# Patient Record
Sex: FEMALE | Race: WHITE | NOT HISPANIC OR LATINO | Employment: FULL TIME | URBAN - METROPOLITAN AREA
[De-identification: names, ages, dates, MRNs, and addresses within clinical notes are randomized per-mention and may not be internally consistent; named-entity substitution may affect disease eponyms.]

---

## 2024-05-14 PROBLEM — R09.82 POST-NASAL DRAINAGE: Status: ACTIVE | Noted: 2024-05-14

## 2024-05-14 PROBLEM — Z20.822 EXPOSURE TO COVID-19 VIRUS: Status: ACTIVE | Noted: 2024-05-14

## 2024-05-14 PROBLEM — B07.8 COMMON WART: Status: ACTIVE | Noted: 2024-05-14

## 2024-05-14 PROBLEM — M25.50 ARTHRALGIA OF MULTIPLE JOINTS: Status: ACTIVE | Noted: 2024-05-14

## 2024-05-14 PROBLEM — J34.3 HYPERTROPHY OF BOTH INFERIOR NASAL TURBINATES: Status: ACTIVE | Noted: 2024-05-14

## 2024-05-14 PROBLEM — E01.0 THYROMEGALY: Status: ACTIVE | Noted: 2024-05-14

## 2024-05-14 PROBLEM — J03.91 RECURRENT ACUTE TONSILLITIS: Status: ACTIVE | Noted: 2024-05-14

## 2024-05-14 PROBLEM — G47.10 HYPERSOMNIA: Status: ACTIVE | Noted: 2024-05-14

## 2024-05-14 PROBLEM — J45.20 MILD INTERMITTENT ASTHMA WITHOUT COMPLICATION (HHS-HCC): Status: ACTIVE | Noted: 2024-05-14

## 2024-05-14 PROBLEM — E66.9 OBESITY (BMI 30-39.9): Status: ACTIVE | Noted: 2021-09-15

## 2024-05-14 PROBLEM — J35.3 HYPERTROPHY OF TONSIL AND ADENOID: Status: ACTIVE | Noted: 2024-05-14

## 2024-05-14 PROBLEM — F41.8 MIXED ANXIETY DEPRESSIVE DISORDER: Status: ACTIVE | Noted: 2017-11-27

## 2024-05-14 PROBLEM — G47.00 HYPOSOMNIA: Status: ACTIVE | Noted: 2021-09-15

## 2024-05-14 PROBLEM — R09.A2 GLOBUS SENSATION: Status: ACTIVE | Noted: 2024-05-14

## 2024-05-14 PROBLEM — J30.9 ALLERGIC RHINITIS: Status: ACTIVE | Noted: 2024-05-14

## 2024-05-14 PROBLEM — J02.9 ACUTE PHARYNGITIS: Status: ACTIVE | Noted: 2024-05-14

## 2024-05-14 PROBLEM — K21.9 GERD WITHOUT ESOPHAGITIS: Status: ACTIVE | Noted: 2024-05-14

## 2024-05-14 PROBLEM — G24.9 DYSKINESIA: Status: ACTIVE | Noted: 2021-10-27

## 2024-05-14 PROBLEM — J35.01 CHRONIC TONSILLITIS: Status: ACTIVE | Noted: 2024-05-14

## 2024-05-14 PROBLEM — G25.81 RLS (RESTLESS LEGS SYNDROME): Status: ACTIVE | Noted: 2021-09-15

## 2024-05-14 RX ORDER — FLUOXETINE HYDROCHLORIDE 40 MG/1
40 CAPSULE ORAL
COMMUNITY
Start: 2023-11-17 | End: 2024-05-15 | Stop reason: SDUPTHER

## 2024-05-14 RX ORDER — ESCITALOPRAM OXALATE 20 MG/1
1 TABLET ORAL DAILY
COMMUNITY
Start: 2020-01-21 | End: 2024-05-15 | Stop reason: ALTCHOICE

## 2024-05-14 RX ORDER — GABAPENTIN 300 MG/1
300 CAPSULE ORAL
COMMUNITY
End: 2024-05-15 | Stop reason: SDUPTHER

## 2024-05-14 RX ORDER — LEVONORGESTREL/ETHIN.ESTRADIOL 0.1-0.02MG
1 TABLET ORAL
COMMUNITY

## 2024-05-14 RX ORDER — ALBUTEROL SULFATE 90 UG/1
AEROSOL, METERED RESPIRATORY (INHALATION)
COMMUNITY
Start: 2017-01-17 | End: 2024-05-15 | Stop reason: ALTCHOICE

## 2024-05-14 RX ORDER — CLONAZEPAM 0.5 MG/1
0.5 TABLET ORAL
COMMUNITY
Start: 2023-11-17 | End: 2024-05-15 | Stop reason: SDUPTHER

## 2024-05-14 RX ORDER — CHLORZOXAZONE 500 MG/1
500 TABLET ORAL EVERY 6 HOURS PRN
COMMUNITY
Start: 2023-06-22 | End: 2024-05-15 | Stop reason: ALTCHOICE

## 2024-05-14 RX ORDER — BUPROPION HYDROCHLORIDE 150 MG/1
150 TABLET ORAL
COMMUNITY
End: 2024-05-15 | Stop reason: ALTCHOICE

## 2024-05-14 RX ORDER — FLUOXETINE HYDROCHLORIDE 20 MG/1
20 CAPSULE ORAL
COMMUNITY
Start: 2023-11-17 | End: 2024-05-15 | Stop reason: SDUPTHER

## 2024-05-14 RX ORDER — BUPROPION HYDROCHLORIDE 300 MG/1
300 TABLET ORAL
COMMUNITY
Start: 2023-11-17 | End: 2024-05-15 | Stop reason: SDUPTHER

## 2024-05-14 RX ORDER — ONDANSETRON 4 MG/1
4 TABLET, ORALLY DISINTEGRATING ORAL EVERY 8 HOURS PRN
COMMUNITY
Start: 2023-07-23

## 2024-05-15 ENCOUNTER — OFFICE VISIT (OUTPATIENT)
Dept: PRIMARY CARE | Facility: CLINIC | Age: 29
End: 2024-05-15
Payer: COMMERCIAL

## 2024-05-15 VITALS
WEIGHT: 164.6 LBS | HEIGHT: 63 IN | OXYGEN SATURATION: 98 % | HEART RATE: 86 BPM | DIASTOLIC BLOOD PRESSURE: 70 MMHG | BODY MASS INDEX: 29.16 KG/M2 | SYSTOLIC BLOOD PRESSURE: 120 MMHG | TEMPERATURE: 97.1 F

## 2024-05-15 DIAGNOSIS — G90.1 DYSAUTONOMIA (MULTI): ICD-10-CM

## 2024-05-15 DIAGNOSIS — G43.809 OTHER MIGRAINE WITHOUT STATUS MIGRAINOSUS, NOT INTRACTABLE: ICD-10-CM

## 2024-05-15 DIAGNOSIS — F41.8 MIXED ANXIETY DEPRESSIVE DISORDER: ICD-10-CM

## 2024-05-15 DIAGNOSIS — F95.1 CHRONIC MOTOR TIC: ICD-10-CM

## 2024-05-15 DIAGNOSIS — Z00.00 ROUTINE GENERAL MEDICAL EXAMINATION AT A HEALTH CARE FACILITY: ICD-10-CM

## 2024-05-15 DIAGNOSIS — E01.0 THYROMEGALY: ICD-10-CM

## 2024-05-15 DIAGNOSIS — J45.20 MILD INTERMITTENT ASTHMA WITHOUT COMPLICATION (HHS-HCC): ICD-10-CM

## 2024-05-15 DIAGNOSIS — F44.4 FUNCTIONAL NEUROLOGICAL SYMPTOM DISORDER (CONVERSION DISORDER), WITH ABNORMAL MOVEMENT: Primary | ICD-10-CM

## 2024-05-15 PROBLEM — J30.9 ALLERGIC RHINITIS: Status: RESOLVED | Noted: 2024-05-14 | Resolved: 2024-05-15

## 2024-05-15 PROBLEM — J02.9 ACUTE PHARYNGITIS: Status: RESOLVED | Noted: 2024-05-14 | Resolved: 2024-05-15

## 2024-05-15 PROBLEM — R45.851 SUICIDAL THOUGHTS: Status: ACTIVE | Noted: 2018-06-02

## 2024-05-15 PROCEDURE — 99385 PREV VISIT NEW AGE 18-39: CPT | Performed by: FAMILY MEDICINE

## 2024-05-15 PROCEDURE — 1036F TOBACCO NON-USER: CPT | Performed by: FAMILY MEDICINE

## 2024-05-15 RX ORDER — BUPROPION HYDROCHLORIDE 300 MG/1
300 TABLET ORAL
Qty: 90 TABLET | Refills: 3 | Status: SHIPPED | OUTPATIENT
Start: 2024-05-15 | End: 2025-05-15

## 2024-05-15 RX ORDER — FLUOXETINE HYDROCHLORIDE 40 MG/1
40 CAPSULE ORAL
Qty: 90 CAPSULE | Refills: 3 | Status: SHIPPED | OUTPATIENT
Start: 2024-05-15 | End: 2025-05-15

## 2024-05-15 RX ORDER — GABAPENTIN 300 MG/1
300 CAPSULE ORAL 3 TIMES DAILY
Qty: 270 CAPSULE | Refills: 3 | Status: SHIPPED | OUTPATIENT
Start: 2024-05-15 | End: 2025-05-15

## 2024-05-15 RX ORDER — CLONAZEPAM 0.5 MG/1
0.5 TABLET ORAL 2 TIMES DAILY PRN
Qty: 60 TABLET | Refills: 1 | Status: SHIPPED | OUTPATIENT
Start: 2024-05-15

## 2024-05-15 RX ORDER — FLUOXETINE HYDROCHLORIDE 20 MG/1
20 CAPSULE ORAL
Qty: 90 CAPSULE | Refills: 3 | Status: SHIPPED | OUTPATIENT
Start: 2024-05-15 | End: 2025-05-15

## 2024-05-15 ASSESSMENT — PAIN SCALES - GENERAL: PAINLEVEL: 4

## 2024-05-15 NOTE — PROGRESS NOTES
Subjective   Patient ID: Erica Aragon is a 28 y.o. female who presents for referral (Patient is present today to discuss medications and needs referrals.).  HPI  28-year-old female presents to \[facility]\"" with a new physician.  Health maintenance visit.  She also needs a neurology referral for a functional neurological symptom disorder with abnormal movement and tics.  She was previously under the care of a neurologist at the [facility].  She has taken a job with [employer].  She now needs a neurologist within the  system.  She also gets Botox injections for migraine headaches.  She needs a renewal on her medications until she sees the new neurologist.  Review of Systems  Constitutional: no chills, no fever and no night sweats.   Eyes: no blurred vision and no eyesight problems.   ENT: no hearing loss, no nasal congestion, no nasal discharge, no hoarseness and no sore throat.   Cardiovascular: no chest pain, no intermittent leg claudication, no lower extremity edema, no palpitations and no syncope.   Respiratory: no cough, no shortness of breath during exertion, no shortness of breath at rest and no wheezing.   Gastrointestinal: no abdominal pain, no blood in stools, no constipation, no diarrhea, no melena, no nausea, no rectal pain and no vomiting.   Genitourinary: no dysuria, no change in urinary frequency, no urinary hesitancy and no feelings of urinary urgency.   Neurological: no difficulty walking, no headache, no limb weakness, no numbness and no tingling.  Movement disorder.  Tics.  Migraine headaches.  Psychiatric: no anxiety, no depression, no anhedonia and no substance use disorders.   Endocrine: no recent weight gain and no recent weight loss.   Hematologic/Lymphatic: no tendency for easy bruising and no swollen glands.  Visit Vitals  /70 (BP Location: Left arm, Patient Position: Sitting, BP Cuff Size: Adult)   Pulse 86   Temp 36.2 °C (97.1 °F) (Temporal)        Objective    Physical Exam  Constitutional: Alert and in no acute distress. Well developed, well nourished.   Eyes: Pupils were equal in size, round, reactive to light (PERRL) with normal accommodation and extraocular movements intact (EOMI). Ophthalmoscopic examination: Normal.   Ears, Nose, Mouth, and Throat: External inspection of ears and nose: Normal. Otoscopic examination: Normal. Lips, teeth, and gums: Normal. Oropharynx: Normal.   Neck: No neck mass was observed. Supple. Thyroid not enlarged and there were no palpable thyroid nodules.   Cardiovascular: Heart rate and rhythm were normal, normal S1 and S2, no gallops, no murmurs and no pericardial rub. Carotid pulses: Normal with no bruits. Abdominal aorta: Normal. Pedal pulses: Normal. No peripheral edema.   Pulmonary: No respiratory distress. Clear bilateral breath sounds.   Abdomen: Soft nontender; no abdominal mass palpated. Normal bowel sounds. No organomegaly.   Skin: Normal skin color and pigmentation, normal skin turgor, and no rash.   Psychiatric: Judgment and insight: Intact. Mood and affect: Normal.  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), with abnormal movement - Primary 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    Relevant Medications    clonazePAM (KlonoPIN) 0.5 mg tablet    Other Relevant Orders    Referral to Neurology    Chronic motor tic F95.1    Relevant Medications    clonazePAM (KlonoPIN) 0.5 mg tablet    Other Relevant Orders    Referral to Neurology     Other Visit Diagnoses         Codes    Other migraine without  status migrainosus, not intractable     G43.809    Relevant Medications    gabapentin (Neurontin) 300 mg capsule    ubrogepant (Ubrelvy) 100 mg tablet tablet    Routine general medical examination at a health care facility     Z00.00    Relevant Orders    Lipid Panel    Comprehensive Metabolic Panel    CBC and Auto Differential

## 2024-06-19 ENCOUNTER — OFFICE VISIT (OUTPATIENT)
Dept: PRIMARY CARE | Facility: CLINIC | Age: 29
End: 2024-06-19
Payer: COMMERCIAL

## 2024-06-19 VITALS
OXYGEN SATURATION: 99 % | SYSTOLIC BLOOD PRESSURE: 120 MMHG | DIASTOLIC BLOOD PRESSURE: 80 MMHG | HEIGHT: 63 IN | TEMPERATURE: 97.1 F | HEART RATE: 87 BPM | WEIGHT: 163.6 LBS | BODY MASS INDEX: 28.99 KG/M2

## 2024-06-19 DIAGNOSIS — K64.8 INTERNAL HEMORRHOID: ICD-10-CM

## 2024-06-19 DIAGNOSIS — F41.8 MIXED ANXIETY DEPRESSIVE DISORDER: ICD-10-CM

## 2024-06-19 DIAGNOSIS — K58.0 IRRITABLE BOWEL SYNDROME WITH DIARRHEA: Primary | ICD-10-CM

## 2024-06-19 PROBLEM — J45.909 ASTHMA (HHS-HCC): Status: ACTIVE | Noted: 2024-06-19

## 2024-06-19 PROBLEM — F41.9 ANXIETY: Status: ACTIVE | Noted: 2024-06-19

## 2024-06-19 PROCEDURE — 99214 OFFICE O/P EST MOD 30 MIN: CPT | Performed by: FAMILY MEDICINE

## 2024-06-19 RX ORDER — HYDROCORTISONE 25 MG/G
CREAM TOPICAL 4 TIMES DAILY PRN
Qty: 30 G | Refills: 5 | Status: SHIPPED | OUTPATIENT
Start: 2024-06-19 | End: 2025-06-19

## 2024-06-19 ASSESSMENT — PAIN SCALES - GENERAL: PAINLEVEL: 4

## 2024-06-19 NOTE — PROGRESS NOTES
Subjective   Patient ID: Erica Aragon is a 28 y.o. female who presents for GI Problem (Patient is present today due to having GI problems, bleed from rectal for over 15 mins. Patient believes she has internal Hemorid. ).  HPI  28-year-old female with history of depression presents with complaints of irritable bowel syndrome and a hemorrhoid.  Review of Systems   Constitutional:  Negative for chills and fever.   All other systems reviewed and are negative.      Visit Vitals  /80 (BP Location: Left arm, Patient Position: Sitting, BP Cuff Size: Adult)   Pulse 87   Temp 36.2 °C (97.1 °F) (Temporal)        Objective   Physical Exam  Constitutional: Alert and in no acute distress. Well developed, well nourished.   Eyes: Pupils were equal in size, round, reactive to light (PERRL) with normal accommodation and extraocular movements intact (EOMI). Ophthalmoscopic examination: Normal.   Ears, Nose, Mouth, and Throat: External inspection of ears and nose: Normal. Otoscopic examination: Normal. Lips, teeth, and gums: Normal. Oropharynx: Normal.   Neck: No neck mass was observed. Supple. Thyroid not enlarged and there were no palpable thyroid nodules.   Cardiovascular: Heart rate and rhythm were normal, normal S1 and S2, no gallops, no murmurs and no pericardial rub. Carotid pulses: Normal with no bruits. Abdominal aorta: Normal. Pedal pulses: Normal. No peripheral edema.   Pulmonary: No respiratory distress. Clear bilateral breath sounds.   Abdomen: Soft nontender; no abdominal mass palpated. Normal bowel sounds. No organomegaly.   Skin: Normal skin color and pigmentation, normal skin turgor, and no rash.   Psychiatric: Judgment and insight: Intact. Mood and affect: Normal.  Assessment/Plan   Problem List Items Addressed This Visit             ICD-10-CM    Irritable bowel syndrome with diarrhea - Primary K58.0    Relevant Orders    Referral to Gastroenterology    Internal hemorrhoid K64.8    Relevant Medications     hydrocortisone (Anusol-HC) 2.5 % rectal cream

## 2024-06-20 ASSESSMENT — ENCOUNTER SYMPTOMS
CHILLS: 0
FEVER: 0

## 2024-07-24 ENCOUNTER — APPOINTMENT (OUTPATIENT)
Dept: NEUROLOGY | Facility: CLINIC | Age: 29
End: 2024-07-24
Payer: COMMERCIAL

## 2024-07-24 VITALS
BODY MASS INDEX: 29.52 KG/M2 | TEMPERATURE: 97.4 F | DIASTOLIC BLOOD PRESSURE: 72 MMHG | HEART RATE: 80 BPM | HEIGHT: 63 IN | RESPIRATION RATE: 18 BRPM | WEIGHT: 166.6 LBS | SYSTOLIC BLOOD PRESSURE: 108 MMHG

## 2024-07-24 DIAGNOSIS — R20.2 NUMBNESS AND TINGLING: ICD-10-CM

## 2024-07-24 DIAGNOSIS — R20.0 NUMBNESS AND TINGLING: ICD-10-CM

## 2024-07-24 DIAGNOSIS — G43.719 INTRACTABLE CHRONIC MIGRAINE WITHOUT AURA AND WITHOUT STATUS MIGRAINOSUS: ICD-10-CM

## 2024-07-24 DIAGNOSIS — G43.809 OTHER MIGRAINE WITHOUT STATUS MIGRAINOSUS, NOT INTRACTABLE: ICD-10-CM

## 2024-07-24 DIAGNOSIS — F44.7 FUNCTIONAL NEUROLOGICAL SYMPTOM DISORDER (CONVERSION DISORDER), WITH MIXED SYMPTOMS: Primary | ICD-10-CM

## 2024-07-24 PROCEDURE — 3008F BODY MASS INDEX DOCD: CPT | Performed by: PSYCHIATRY & NEUROLOGY

## 2024-07-24 PROCEDURE — 99205 OFFICE O/P NEW HI 60 MIN: CPT | Performed by: PSYCHIATRY & NEUROLOGY

## 2024-07-24 PROCEDURE — 1036F TOBACCO NON-USER: CPT | Performed by: PSYCHIATRY & NEUROLOGY

## 2024-07-24 RX ORDER — CYCLOBENZAPRINE HCL 5 MG
TABLET ORAL
Qty: 60 TABLET | Refills: 2 | Status: SHIPPED | OUTPATIENT
Start: 2024-07-24

## 2024-07-24 ASSESSMENT — PAIN SCALES - GENERAL: PAINLEVEL: 7

## 2024-07-24 NOTE — PROGRESS NOTES
Consultation:   Lyndsey Aragon is a 28 y.o. year old female is here for consult for movement disorder.     Referred by DO FERNANDA Wilson  She has a movement where her head and neck turn to the right which started in 2021.  Prior to this she was in infested with millipedes at [facility] working at [employer].  She felt she was under a lot of stress, not eating well and not sleeping well.  She started seeing a psychologist and then went into inpatient psychiatric hospitalization in 2021 and felt with her medications side effects with walking slowly for 2 days.  Few days after being home from this hospitalization she began having these neck movements.  Started to have tremors in right arm and right leg that started about one year ago which sometimes interrupts her life like she said she almost fell playing volleyball last week due to these.  These are intermittent.   She has numbness in hands and legs at times.  She has pain in various areas, shoulder and neck a lot.  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   On Neurotin, Prozac, clonazepam, Wellbutrin.  Was being seen by Dr. Shoemaker from [facility] neurology, last seen 6/5/24.   She gets Botox with [facility] for migraine with Miladis Rahman CNP, last injected her with Botox on 10/2/2023 with 200 units.   Also was given Ubrelvy which helps but she is running out of it.   Botox helped her a lot , had 15 headache free days which helped her a lot.     PRIOR to Botox-   Onset of headaches:  teenager   Frequency of headaches (days per month):  > 25 days /month.  Duration of headaches (average): all day , throbbing   Severity of headaches (out of 10): 8/10  Aura: sometimes a bunt smell, sometimes blurred vision.   Nausea/vomiting:  yes   Photophobia: yes   Phonophobia: yes     Location: b/l temporal. B/l occipital.     Preventative medications:  Botox  Gabapentin  Wellbutrin   Prozac    Abortive medications:  Nurtec  Triptans    Ubrelvy 100mg helps her.     Denies SI/HI.   Review of Systems    Patient Active Problem List   Diagnosis    Arthralgia of multiple joints    Chronic tonsillitis    Common wart    Dyskinesia    Exposure to COVID-19 virus    GERD without esophagitis    Globus sensation    Hypertrophy of tonsil and adenoid    Mild intermittent asthma without complication ([facility])    Mixed anxiety depressive disorder    Obesity (BMI 30-39.9)    Post-nasal drainage    Hyposomnia    Recurrent acute tonsillitis    Syncope and collapse    Thyromegaly    Dysautonomia (Multi)    Hypersomnia    Hypertrophy of both inferior nasal turbinates    RLS (restless legs syndrome)    Suicidal thoughts    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  Social History     Tobacco Use    Smoking status: Never    Smokeless tobacco: Never   Substance Use Topics    Alcohol use: Not on file     family history is not on file.    Current Outpatient Medications:     buPROPion XL (Wellbutrin XL) 300 mg 24 hr tablet, Take 1 tablet (300 mg) by mouth once daily., Disp: 90 tablet, Rfl: 3    clonazePAM (KlonoPIN) 0.5 mg tablet, Take 1 tablet (0.5 mg) by mouth 2 times a day as needed for anxiety., Disp: 60 tablet, Rfl: 1    FLUoxetine (PROzac) 20 mg capsule, Take 1 capsule (20 mg) by mouth once daily., Disp: 90 capsule, Rfl: 3    FLUoxetine (PROzac) 40 mg capsule, Take 1 capsule (40 mg) by  mouth once daily., Disp: 90 capsule, Rfl: 3    gabapentin (Neurontin) 300 mg capsule, Take 1 capsule (300 mg) by mouth 3 times a day., Disp: 270 capsule, Rfl: 3    hydrocortisone (Anusol-HC) 2.5 % rectal cream, Insert into the rectum 4 times a day as needed for hemorrhoids (rectal discomfort)., Disp: 30 g, Rfl: 5    levonorgestreL-ethinyl estrad (Aviane, Alesse, Lessina) 0.1-20 mg-mcg tablet, Take 1 tablet by mouth once daily., Disp: , Rfl:     ondansetron ODT (Zofran-ODT) 4 mg disintegrating tablet, Take 1 tablet (4 mg) by mouth every 8 hours if needed., Disp: , Rfl:     ubrogepant (Ubrelvy) 100 mg tablet tablet, Take 1 tablet (100 mg) by mouth 1 time if needed (headache)., Disp: 16 tablet, Rfl: 3  Allergies   Allergen Reactions    Penicillins Unknown     There were no vitals taken for this visit.  Neurological Exam/Physical Exam:    Constitutional: General appearance: no acute distress. Pleasant.   Auscultation of Heart: Regular rate and rhythm, no murmurs, normal S1 and S2.   Carotid Arteries: no bruits  Peripheral Vascular Exam: No swelling, edema or tenderness to palpation in extremities  Mental status: no distress, alert, interactive and cooperative. Affect is appropriate.   Orientation: oriented to person, oriented to place and oriented to time.   Memory: recent memory intact and remote memory intact.   Attention: normal attention /concentration.   Language: normal comprehension and naming.   Fund of knowledge: Patient displays adequate knowledge of current events.  Eyes: The ophthalmoscopic examination was normal.   Cranial nerve II: Visual fields full to confrontation.   Cranial nerves III, IV, and VI: Pupils round, equally reactive to light; EOMs intact. No nystagmus.   Cranial Nerve V: Facial sensation intact to LT bilaterally.   Cranial nerve VII: no facial droop  Cranial nerve VIII: Hearing is intact  Cranial nerves IX and X: Palate elevates symmetrically.   Cranial nerve XI: Shoulder shrug intact.    Cranial nerve XII: Tongue is midline.  Motor:  Muscle bulk was normal in both upper and lower extremities.    No atrophy.   Strength is normal.  Occasional quick head turning toward the right with a tilt that lasts about 1-2 seconds.   Deep Tendon Reflexes: left biceps 2+ , right biceps 2+, left triceps 2+, right triceps 2+, left brachioradialis 2+, right brachioradialis 2+, left patella 2+, right patella 2+, left ankle jerk 2+, right ankle jerk 2+   Plantar Reflex: Toes downgoing to plantar stimulation on the left. Toes downgoing to plantar stimulation on the right.   Sensory Exam: Normal to vibratory sensation  Coordination:  no limb dystaxia and rapid alternating movements are intact.   Gait:  cautious.         Labs:  CBC:   Lab Results   Component Value Date    WBC 6.0 04/09/2021    HGB 12.3 04/09/2021    HCT 39.4 04/09/2021     04/09/2021         Assessment/Plan   Problem List Items Addressed This Visit    None  Visit Diagnoses         Codes    Functional neurological symptom disorder (conversion disorder), with mixed symptoms    -  Primary F44.7        Would consider seeing a headache expert.  Obtain migraine botox approval for 200 units ( changing provider) and will benefit from seeing a neurologist for migraine management as well.   Will obtain neuropathy labs for numbness/tingling.  Tremor - observe.  No signs of parkinsonism.  May be wellbutrin side effect which we discussed today.   Tics- topamax as potential .  Will trial flexeril for as needed basis.   Continue therapist and psychiatry follow up.   [facility] referral for pain.

## 2024-07-24 NOTE — LETTER
Kim Baltazar M.D.  Phone [phone]  Fax [phone]    2024    Re:  Patient Erica Aragon  : 1995    To Whom It May Concern:    Erica Aragon   has suffered from intractable chronic migraine headache without aura and without status migrainosus (ICD-10 code G43.719) meeting criteria for chronic migraine headache. The intensity is 8/10 and occurring about 25 days/month.  She has had Botox in the past with very good success but needs to switch providers due to insurance purposes.       She has been on prophylactic medications for migraine including:  anticonvulsant: Gabapentin  Antidepressant: Prozac, Wellbutrin  Botox  - great response.   Additionally, she had numerous adverse effects and problems with these oral medications.  She has also tried the following abortive therapies:    Abortive therapies: Ibuprofen, Aleve, Excedrin migraine, Sumatripan, Relpax, Ubrevly, Nurtec.      Her headaches have proven refractory to all of the above-named therapies. An excellent strategy to prevent frequent migraines is botulinum toxin injections (CPT code 69938), a form of therapy approved by Medicare. In this procedure, onabotulinumtoxinA (Botox; CPT code ) is injected into appropriate facial and cranial areas. Published studies of botulinum toxin injections for migraines, and my personal experience, indicate that they can produce dramatic benefit, lasting 3 months or more, in patients refractory to medical treatment. Aside from major gains in quality of life enjoyed by patients, botulinum toxin injections usually prove to be highly cost-effective by reducing the number of prescription medications (or eliminating them altogether), doctor's office or Emergency Room visits, and hospital admissions that patients require.    With this procedure, the administration of botulinum is incident to the physician's service of chemodenervation. The charge for the biological on the patient's bill represents the  cost to the physician. In addition, benefit from the injections depends on the accurate positioning of a needle tip in the target muscles or other tissues. Administration of this biological requires that only a physician provide this service, in part because of the clinical importance of precise anatomic placement of the injections. Consequently, injections of this type are far from routine, and appropriate charges for the procedure are indicated on the patient's bill.     If you need further information, please contact me.    Yours sincerely,      Kim Baltazar M.D.

## 2024-07-24 NOTE — PATIENT INSTRUCTIONS
I believe that you will be a great candidate for Botox injections. I typically advise patients to give Botox at least 3 sessions ( 3 cycles, 1 session every 3 months) for an adequate trial.  This allows us to make any adjustments to doses/muscles to adequately treat you.   The Botox visits are targeted for Botox injections only, these appointments are not clinical visits - if you feel that you may need a clinical visit between these sessions please arrange that separately.   There are no restrictions for you that day.  Generally, eat and drink per your usual.  It is advisable to be hydrated prior to injections.  Botox injections take about 5-8 days before starting to become effective, then there is a peak effect around 2 weeks, then slowly wears off around 2-3 months.  The closest time to re-inject is 3 months.     FOR BOTOX APPOINTMENTS IF YOU NEED TO RESCHEDULE OR CANCEL PLEASE CALL [phone] option 6 AND LEAVE A VOICEMAIL.     I will be getting insurance authorization which takes up to 4 weeks so you can schedule a Botox injection around that time.   Prior authorizations for Botox will be organized with our staff- should you have any questions about this please call (Ask for my medical assistant-  please call [phone] option 6.  In the future if there are ANY changes to your insurance you will need to inform our office PRIOR to getting Botox injections as any insurance change will require another authorization separately.    For commercial insurance plans -There is a Botox savings program that you may be able to receive reimbursement for your out-of-pocket costs that are not covered by insurance (up to $400 per treatment)- depending on your insurance you may end up paying $0 for treatment. (for commercial insurance only).  How to sign up and submit a claim: go to [employer] and fill out paperwork online OR call [phone], Option 4.

## 2024-08-21 ENCOUNTER — APPOINTMENT (OUTPATIENT)
Dept: NEUROLOGY | Facility: CLINIC | Age: 29
End: 2024-08-21
Payer: COMMERCIAL

## 2024-08-28 ENCOUNTER — LAB (OUTPATIENT)
Dept: LAB | Facility: LAB | Age: 29
End: 2024-08-28
Payer: COMMERCIAL

## 2024-08-29 LAB — COTININE UR QL SCN: NEGATIVE

## 2024-09-16 DIAGNOSIS — F44.4 FUNCTIONAL NEUROLOGICAL SYMPTOM DISORDER (CONVERSION DISORDER), WITH ABNORMAL MOVEMENT: Primary | ICD-10-CM

## 2024-09-16 DIAGNOSIS — M25.50 ARTHRALGIA OF MULTIPLE JOINTS: ICD-10-CM

## 2024-09-17 ENCOUNTER — APPOINTMENT (OUTPATIENT)
Dept: NEUROLOGY | Facility: CLINIC | Age: 29
End: 2024-09-17
Payer: COMMERCIAL

## 2024-09-18 ENCOUNTER — APPOINTMENT (OUTPATIENT)
Dept: INTEGRATIVE MEDICINE | Facility: CLINIC | Age: 29
End: 2024-09-18
Payer: COMMERCIAL

## 2024-09-18 DIAGNOSIS — M54.2 CHRONIC NECK PAIN: Primary | ICD-10-CM

## 2024-09-18 DIAGNOSIS — G89.29 CHRONIC NECK PAIN: Primary | ICD-10-CM

## 2024-09-18 PROCEDURE — 99202 OFFICE O/P NEW SF 15 MIN: CPT | Performed by: ACUPUNCTURIST

## 2024-09-18 PROCEDURE — 97810 ACUP 1/> WO ESTIM 1ST 15 MIN: CPT | Performed by: ACUPUNCTURIST

## 2024-09-18 PROCEDURE — 97811 ACUP 1/> W/O ESTIM EA ADD 15: CPT | Performed by: ACUPUNCTURIST

## 2024-09-18 SDOH — SOCIAL STABILITY: SOCIAL NETWORK: I HAVE TROUBLE DOING ALL OF MY USUAL WORK (INCLUDE WORK AT HOME): USUALLY

## 2024-09-18 SDOH — SOCIAL STABILITY: SOCIAL NETWORK: I HAVE TROUBLE DOING ALL OF THE ACTIVITIES WITH FRIENDS THAT I WANT TO DO: USUALLY

## 2024-09-18 SDOH — SOCIAL STABILITY: SOCIAL NETWORK: I HAVE TROUBLE DOING ALL OF MY REGULAR LEISURE ACTIVITIES WITH OTHERS: USUALLY

## 2024-09-18 SDOH — SOCIAL STABILITY: SOCIAL NETWORK: I HAVE TROUBLE DOING ALL OF THE FAMILY ACTIVITIES THAT I WANT TO DO: USUALLY

## 2024-09-18 SDOH — SOCIAL STABILITY: SOCIAL NETWORK: PROMIS ABILITY TO PARTICIPATE IN SOCIAL ROLES & ACTIVITIES T-SCORE: 39

## 2024-09-18 SDOH — SOCIAL STABILITY: SOCIAL NETWORK

## 2024-09-18 ASSESSMENT — PROMIS GLOBAL HEALTH SCALE
RATE_QUALITY_OF_LIFE: GOOD
RATE_SOCIAL_SATISFACTION: FAIR
CARRYOUT_SOCIAL_ACTIVITIES: FAIR
EMOTIONAL_PROBLEMS: OFTEN
RATE_PHYSICAL_HEALTH: FAIR
RATE_AVERAGE_PAIN: 8
RATE_AVERAGE_FATIGUE: SEVERE
CARRYOUT_PHYSICAL_ACTIVITIES: MOSTLY
RATE_MENTAL_HEALTH: FAIR
RATE_GENERAL_HEALTH: FAIR

## 2024-09-18 NOTE — PROGRESS NOTES
Acupuncture Visit:     Please note: Dictation software was used while completing this note and may contain spelling, grammatical, and/or syntax errors.  Please contact me with any questions.    To clinicians, I am always happy to partner with you in the care of your patients. Do not hesitate to call the office or contact me directly regarding any further consultations or with questions regarding the plan of care outlined or patient progress.    Subjective   Patient ID: Erica Aragon is a 28 y.o. female who presents for Neck Pain and Migraine  Functional movement disorder dx 11/2021, sx began 10/2021  Inpatient psych stress and overmedication possible cause    Pain  Worse: tics, holding head up for long periods  Better: laying down     Tics: stress, anxiety, temperature changes, connected to OCD    Head, neck, shoulders, upper back  Developed tic, head turns to one side    Chronic migraines, R>L  Worse: no known triggers  Better        Session Information  Is this acupuncture treatment being billed to the patient's insurance company: Yes  This is visit number: 1  The patient has a total number of visits of: 20  Initial Acupuncture Treatment date: 09/18/24  Name of Insurance Company:  Employee Medical Plan  Visit Type: New patient  Medical History Reviewed: I have reviewed pertinent medical history in EHR, and no contraindications are present to provide treatment         Review of Systems         Provider reviewed plan for the acupuncture session, precautions and contraindications. Patient/guardian/hospital staff has given consent to treat with full understanding of what to expect during the session. Before acupuncture began, provider explained to the patient to communicate at any time if the procedure was causing discomfort past their tolerance level. Patient agreed to advise acupuncturist. The acupuncturist counseled the patient on the risks of acupuncture treatment including pain, infection, bleeding, and no  relief of pain. The patient was positioned comfortably. There was no evidence of infection at the site of needle insertions.    Objective   Physical Exam         Treatment Plan  Treatment Goals: Pain management, Wellbeing improvement    Acupuncture Treatment  Needle Guage: 40 guage /.16/ Red seirin, 42 guage /.14/ Lime green seirin  Body Points - Left: DLPFC x2  Body Points - Bilateral: KD7, KD10, ST Qi line x3, LIV3  Body Points - Right: GB41  Other Techniques Utilized: TDP Lamp  TDP Lamp Descripton: Feet, 20min  Needle Count In: 15  Needle Count Out: 15  Needle Retention Time (min): 20 minutes  Total Face to Face Time (min): 25 minutes              Assessment/Plan

## 2024-09-24 NOTE — PATIENT INSTRUCTIONS
Frequency of visits: Recommend begin with 6-8 treatments, 1x/week, then re-evaluate for maintenance. Treatment recommendation may change depending on the severity and/or duration of symptoms.    Diet/lifestyle suggestions: Drink enough water over the next few days; apply heat as directed to affected areas    Please feel free to contact me with any questions or concerns

## 2024-10-01 ENCOUNTER — APPOINTMENT (OUTPATIENT)
Dept: INTEGRATIVE MEDICINE | Facility: CLINIC | Age: 29
End: 2024-10-01
Payer: COMMERCIAL

## 2024-10-03 ENCOUNTER — APPOINTMENT (OUTPATIENT)
Dept: NEUROLOGY | Facility: HOSPITAL | Age: 29
End: 2024-10-03
Payer: COMMERCIAL

## 2024-10-07 ENCOUNTER — APPOINTMENT (OUTPATIENT)
Dept: GASTROENTEROLOGY | Facility: CLINIC | Age: 29
End: 2024-10-07
Payer: COMMERCIAL

## 2024-10-07 VITALS
HEIGHT: 63 IN | HEART RATE: 58 BPM | WEIGHT: 167 LBS | SYSTOLIC BLOOD PRESSURE: 128 MMHG | BODY MASS INDEX: 29.59 KG/M2 | DIASTOLIC BLOOD PRESSURE: 84 MMHG

## 2024-10-07 DIAGNOSIS — R12 HEARTBURN: ICD-10-CM

## 2024-10-07 DIAGNOSIS — R14.0 GASSINESS: ICD-10-CM

## 2024-10-07 DIAGNOSIS — R10.9 ABDOMINAL PAIN, UNSPECIFIED ABDOMINAL LOCATION: Primary | ICD-10-CM

## 2024-10-07 DIAGNOSIS — K92.1 HEMATOCHEZIA: ICD-10-CM

## 2024-10-07 DIAGNOSIS — K58.0 IRRITABLE BOWEL SYNDROME WITH DIARRHEA: ICD-10-CM

## 2024-10-07 DIAGNOSIS — R14.0 ABDOMINAL BLOATING: ICD-10-CM

## 2024-10-07 DIAGNOSIS — K58.1 IRRITABLE BOWEL SYNDROME WITH CONSTIPATION: ICD-10-CM

## 2024-10-07 PROCEDURE — 3008F BODY MASS INDEX DOCD: CPT | Performed by: STUDENT IN AN ORGANIZED HEALTH CARE EDUCATION/TRAINING PROGRAM

## 2024-10-07 PROCEDURE — 99205 OFFICE O/P NEW HI 60 MIN: CPT | Performed by: STUDENT IN AN ORGANIZED HEALTH CARE EDUCATION/TRAINING PROGRAM

## 2024-10-07 PROCEDURE — 1036F TOBACCO NON-USER: CPT | Performed by: STUDENT IN AN ORGANIZED HEALTH CARE EDUCATION/TRAINING PROGRAM

## 2024-10-07 RX ORDER — POLYETHYLENE GLYCOL 3350 17 G/17G
17 POWDER, FOR SOLUTION ORAL DAILY
Qty: 30 PACKET | Refills: 11 | Status: SHIPPED | OUTPATIENT
Start: 2024-10-07 | End: 2025-10-07

## 2024-10-07 RX ORDER — OMEPRAZOLE 40 MG/1
40 CAPSULE, DELAYED RELEASE ORAL DAILY
Qty: 30 CAPSULE | Refills: 11 | Status: SHIPPED | OUTPATIENT
Start: 2024-10-07 | End: 2025-10-07

## 2024-10-07 RX ORDER — SODIUM CHLORIDE, SODIUM LACTATE, POTASSIUM CHLORIDE, CALCIUM CHLORIDE 600; 310; 30; 20 MG/100ML; MG/100ML; MG/100ML; MG/100ML
20 INJECTION, SOLUTION INTRAVENOUS CONTINUOUS
OUTPATIENT
Start: 2024-10-07

## 2024-10-07 RX ORDER — SENNOSIDES 8.6 MG/1
17.2 CAPSULE, GELATIN COATED ORAL 2 TIMES DAILY
Qty: 60 CAPSULE | Refills: 11 | Status: SHIPPED | OUTPATIENT
Start: 2024-10-07 | End: 2025-10-02

## 2024-10-07 RX ORDER — SODIUM, POTASSIUM,MAG SULFATES 17.5-3.13G
1 SOLUTION, RECONSTITUTED, ORAL ORAL 2 TIMES DAILY
Qty: 354 ML | Refills: 0 | Status: SHIPPED | OUTPATIENT
Start: 2024-10-07

## 2024-10-07 RX ORDER — ONDANSETRON HYDROCHLORIDE 2 MG/ML
4 INJECTION, SOLUTION INTRAVENOUS ONCE AS NEEDED
OUTPATIENT
Start: 2024-10-07

## 2024-10-07 NOTE — PATIENT INSTRUCTIONS
1-to help with your bowel movements please start taking senna 2 tablets twice a day, Gas-X over-the-counter 3 or 4 times a day  2-good job increasing water intake and fiber intake and physical activity as tolerated  3-please limit ibuprofen intake, you can take Tylenol within the recommended dose, please start taking stomach protection omeprazole daily half an hour before breakfast   4-we need to schedule for an upper endoscopy and a colonoscopy.  For 1 week before your procedure please start taking MiraLAX daily to make sure that your prep is clean in addition to the gallon to drink the day before your procedure  5-for the anal discomfort you can do sitz bath twice a day.  If it still happening you can do Preparation H twice a day for 10 days, repeat the course as needed

## 2024-10-07 NOTE — PROGRESS NOTES
28-year-old lady works as a  with history of anxiety depression, functional movement disorder, migraines presenting with chronic symptoms of epigastric discomfort, stabbing, on a daily basis, not at night, lasting for minutes, associated with significant amount of gas, mentions intermittent small to moderate amount of blood in the stools and on the toilet paper in addition to anal discomfort.  Patient mentions heartburn few times per week especially when she wakes up, not related to food.    EGD never  Colonoscopy never  Family said reviewed, not pertinent to chief complaint  Has 1 bowel movement per week, [place] 4-8, back-to-back with incomplete bowel emptying  Takes ibuprofen daily, social alcohol use, denies marijuana drug use smoking             The note was created using voice recognition transcription software. Despite proofreading, unintentional typographical errors may be present. Please contact the GI office with any questions or concerns.     Current Medications: reviewed    A 10 point review of system is negative except for what is mentioned in the HPI    Follow up with GI was advised       Vital Signs: Reviewed    Physical Exam:  General: no apparent distress, pleasant and cooperative  Skin:  Warm and dry, no jaundice  HEENT: No scleral icterus, no conjunctival pallor, normocephalic, atraumatic, mucous membranes moist  Neck:  atraumatic, trachea midline, no JVD  Chest:  decreased air entry to auscultation bilaterally. No wheezes, rales, or rhonchi  CV:  Regular rate and rhythm.  Positive S1/S2  Abdomen: no distension, +BS, soft, mild epigastric-tender to palpation, no rebound tenderness, no guarding, no rigidity, no discernible ascites   Extremities: lower extremity edema, Chronic pigmentary changes, no cyanosis  Neurological:  A&Ox3 , no asterixis  Psychiatric: cooperative     Investigations:  Labs, radiological imaging and cardiac work up were reviewed    1-hepatitis C antibody  - 5/2015    2-BMI 29, healthy lifestyle advised    3-abdominal discomfort described as above in addition to intermittent hematochezia and an anal discomfort, significant amount of stools on CAT scan 1/2014, prefers to defer rectal exam until during colonoscopy  *Component of IBS, start senna 2 tablets twice a day, lifestyle changes advised, will schedule EGD and colonoscopy after 1 week of twice daily MiraLAX,  *Will need to rule out peptic ulcer disease in the setting of ibuprofen intake, start omeprazole daily, avoid NSAIDs, will follow EGD result    4-heartburn described as above, omeprazole daily, lifestyle changes, follow EGD result

## 2024-10-10 ENCOUNTER — APPOINTMENT (OUTPATIENT)
Dept: INTEGRATIVE MEDICINE | Facility: CLINIC | Age: 29
End: 2024-10-10
Payer: COMMERCIAL

## 2024-10-10 DIAGNOSIS — G89.29 CHRONIC NECK PAIN: Primary | ICD-10-CM

## 2024-10-10 DIAGNOSIS — M54.2 CHRONIC NECK PAIN: Primary | ICD-10-CM

## 2024-10-10 PROCEDURE — 97810 ACUP 1/> WO ESTIM 1ST 15 MIN: CPT | Performed by: ACUPUNCTURIST

## 2024-10-10 PROCEDURE — 97811 ACUP 1/> W/O ESTIM EA ADD 15: CPT | Performed by: ACUPUNCTURIST

## 2024-10-10 ASSESSMENT — ENCOUNTER SYMPTOMS: NECK PAIN: 1

## 2024-10-10 NOTE — PROGRESS NOTES
Acupuncture Visit:     Please note: Dictation software was used while completing this note and may contain spelling, grammatical, and/or syntax errors.  Please contact me with any questions.    To clinicians, I am always happy to partner with you in the care of your patients. Do not hesitate to call the office or contact me directly regarding any further consultations or with questions regarding the plan of care outlined or patient progress.    Subjective   Patient ID: Erica Aragon is a 28 y.o. female who presents for Neck Pain and Migraine    Insurance visit 2 of 20    Patient stated that her neck is feeling good today, noting that she has started seeing a chiropractor which has been beneficial    She reports frequent migraines and daily HA      Initial intake (09/18/2024 MQ)    Functional movement disorder dx 11/2021, sx began 10/2021  Inpatient psych stress and overmedication possible cause    Pain  Worse: tics, holding head up for long periods  Better: laying down     Tics: stress, anxiety, temperature changes, connected to OCD    Head, neck, shoulders, upper back  Developed tic, head turns to one side    Chronic migraines, R>L  Worse: no known triggers  Better    Neck Pain   This is a chronic problem.       Session Information  Is this acupuncture treatment being billed to the patient's insurance company: Yes  This is visit number: 2  The patient has a total number of visits of: 20  Initial Acupuncture Treatment date: 09/18/24  Last Treatment date: 09/18/24  Name of Insurance Company:  Employee Medical Plan  Visit Type: Follow-up visit  Medical History Reviewed: I have reviewed pertinent medical history in EHR, and no contraindications are present to provide treatment         Review of Systems   Musculoskeletal:  Positive for neck pain.            Provider reviewed plan for the acupuncture session, precautions and contraindications. Patient/guardian/hospital staff has given consent to treat with full  understanding of what to expect during the session. Before acupuncture began, provider explained to the patient to communicate at any time if the procedure was causing discomfort past their tolerance level. Patient agreed to advise acupuncturist. The acupuncturist counseled the patient on the risks of acupuncture treatment including pain, infection, bleeding, and no relief of pain. The patient was positioned comfortably. There was no evidence of infection at the site of needle insertions.    Objective   Physical Exam         Treatment Plan  Treatment Goals: Pain management, Wellbeing improvement, Stress reduction    Acupuncture Treatment  Patient Position: Prone on a table  Needle Guage: 36 guage /.20/ Blue seirin  Body Points - Bilateral: KD3, BL23, BL52, BL18, BL14, suboccipitals, upper traps, levator  Other Techniques Utilized: TDP Lamp, Topicals, Cupping  Cupping Description: Flash, paraspinals/upper back  Topicals Description: Sports Massage oil  TDP Lamp Descripton: Upper back, 20min  Needle Count In: 16  Needle Count Out: 16  Needle Retention Time (min): 20 minutes  Total Face to Face Time (min): 25 minutes              Assessment/Plan

## 2024-10-24 ENCOUNTER — APPOINTMENT (OUTPATIENT)
Dept: INTEGRATIVE MEDICINE | Facility: CLINIC | Age: 29
End: 2024-10-24
Payer: COMMERCIAL

## 2024-11-06 ENCOUNTER — APPOINTMENT (OUTPATIENT)
Dept: NEUROLOGY | Facility: CLINIC | Age: 29
End: 2024-11-06
Payer: COMMERCIAL

## 2024-11-11 ENCOUNTER — ANESTHESIA EVENT (OUTPATIENT)
Dept: GASTROENTEROLOGY | Facility: EXTERNAL LOCATION | Age: 29
End: 2024-11-11

## 2024-11-13 ENCOUNTER — APPOINTMENT (OUTPATIENT)
Dept: INTEGRATIVE MEDICINE | Facility: CLINIC | Age: 29
End: 2024-11-13
Payer: COMMERCIAL

## 2024-11-20 ENCOUNTER — ANESTHESIA (OUTPATIENT)
Dept: GASTROENTEROLOGY | Facility: EXTERNAL LOCATION | Age: 29
End: 2024-11-20

## 2024-11-20 ENCOUNTER — APPOINTMENT (OUTPATIENT)
Dept: GASTROENTEROLOGY | Facility: EXTERNAL LOCATION | Age: 29
End: 2024-11-20
Payer: COMMERCIAL

## 2024-11-20 VITALS
SYSTOLIC BLOOD PRESSURE: 130 MMHG | HEIGHT: 63 IN | BODY MASS INDEX: 29.23 KG/M2 | HEART RATE: 68 BPM | RESPIRATION RATE: 14 BRPM | OXYGEN SATURATION: 99 % | TEMPERATURE: 98.1 F | WEIGHT: 165 LBS | DIASTOLIC BLOOD PRESSURE: 89 MMHG

## 2024-11-20 DIAGNOSIS — K92.1 HEMATOCHEZIA: ICD-10-CM

## 2024-11-20 DIAGNOSIS — R10.9 ABDOMINAL PAIN, UNSPECIFIED ABDOMINAL LOCATION: ICD-10-CM

## 2024-11-20 LAB — PREGNANCY TEST URINE, POC: NEGATIVE

## 2024-11-20 PROCEDURE — 81025 URINE PREGNANCY TEST: CPT | Performed by: STUDENT IN AN ORGANIZED HEALTH CARE EDUCATION/TRAINING PROGRAM

## 2024-11-20 PROCEDURE — 45378 DIAGNOSTIC COLONOSCOPY: CPT | Performed by: STUDENT IN AN ORGANIZED HEALTH CARE EDUCATION/TRAINING PROGRAM

## 2024-11-20 PROCEDURE — 88305 TISSUE EXAM BY PATHOLOGIST: CPT | Mod: TC,ELYLAB | Performed by: STUDENT IN AN ORGANIZED HEALTH CARE EDUCATION/TRAINING PROGRAM

## 2024-11-20 PROCEDURE — 43239 EGD BIOPSY SINGLE/MULTIPLE: CPT | Performed by: STUDENT IN AN ORGANIZED HEALTH CARE EDUCATION/TRAINING PROGRAM

## 2024-11-20 RX ORDER — SODIUM CHLORIDE, SODIUM LACTATE, POTASSIUM CHLORIDE, CALCIUM CHLORIDE 600; 310; 30; 20 MG/100ML; MG/100ML; MG/100ML; MG/100ML
20 INJECTION, SOLUTION INTRAVENOUS CONTINUOUS
Status: DISCONTINUED | OUTPATIENT
Start: 2024-11-20 | End: 2024-11-21 | Stop reason: HOSPADM

## 2024-11-20 RX ORDER — ONDANSETRON HYDROCHLORIDE 2 MG/ML
4 INJECTION, SOLUTION INTRAVENOUS ONCE AS NEEDED
Status: DISCONTINUED | OUTPATIENT
Start: 2024-11-20 | End: 2024-11-21 | Stop reason: HOSPADM

## 2024-11-20 RX ORDER — MIDAZOLAM HYDROCHLORIDE 1 MG/ML
INJECTION, SOLUTION INTRAMUSCULAR; INTRAVENOUS AS NEEDED
Status: DISCONTINUED | OUTPATIENT
Start: 2024-11-20 | End: 2024-11-20

## 2024-11-20 RX ORDER — FENTANYL CITRATE 50 UG/ML
INJECTION, SOLUTION INTRAMUSCULAR; INTRAVENOUS AS NEEDED
Status: DISCONTINUED | OUTPATIENT
Start: 2024-11-20 | End: 2024-11-20

## 2024-11-20 RX ORDER — LIDOCAINE HYDROCHLORIDE 20 MG/ML
INJECTION, SOLUTION INFILTRATION; PERINEURAL AS NEEDED
Status: DISCONTINUED | OUTPATIENT
Start: 2024-11-20 | End: 2024-11-20

## 2024-11-20 RX ORDER — PROPOFOL 10 MG/ML
INJECTION, EMULSION INTRAVENOUS AS NEEDED
Status: DISCONTINUED | OUTPATIENT
Start: 2024-11-20 | End: 2024-11-20

## 2024-11-20 RX ORDER — SODIUM CHLORIDE 9 MG/ML
INJECTION, SOLUTION INTRAVENOUS CONTINUOUS PRN
Status: DISCONTINUED | OUTPATIENT
Start: 2024-11-20 | End: 2024-11-20

## 2024-11-20 SDOH — HEALTH STABILITY: MENTAL HEALTH: CURRENT SMOKER: 0

## 2024-11-20 ASSESSMENT — PAIN - FUNCTIONAL ASSESSMENT
PAIN_FUNCTIONAL_ASSESSMENT: 0-10

## 2024-11-20 ASSESSMENT — COLUMBIA-SUICIDE SEVERITY RATING SCALE - C-SSRS
1. IN THE PAST MONTH, HAVE YOU WISHED YOU WERE DEAD OR WISHED YOU COULD GO TO SLEEP AND NOT WAKE UP?: NO
6. HAVE YOU EVER DONE ANYTHING, STARTED TO DO ANYTHING, OR PREPARED TO DO ANYTHING TO END YOUR LIFE?: NO
2. HAVE YOU ACTUALLY HAD ANY THOUGHTS OF KILLING YOURSELF?: NO

## 2024-11-20 ASSESSMENT — PAIN SCALES - GENERAL
PAINLEVEL_OUTOF10: 0 - NO PAIN
PAINLEVEL_OUTOF10: 0 - NO PAIN
PAIN_LEVEL: 0
PAINLEVEL_OUTOF10: 0 - NO PAIN
PAINLEVEL_OUTOF10: 0 - NO PAIN

## 2024-11-20 NOTE — Clinical Note
Patient tolerated the procedure well and is comfortable with no complaints of pain. Vital signs stable. Arousable prior to transport. Patient transported to PACU via stretcher. Handoff completed. Abdomen soft non distended

## 2024-11-20 NOTE — ANESTHESIA PREPROCEDURE EVALUATION
Patient: Erica Aragon    Procedure Information       Date/Time: 11/20/24 1230    Scheduled providers: Alexandre Kauffman MD; DARCY Marie-CRNA    Procedures:       EGD      COLONOSCOPY    Location: [facility]            Relevant Problems   Pulmonary   (+) Asthma   (+) Mild intermittent asthma without complication (HHS-HCC)      Neuro   (+) Anxiety   (+) Mixed anxiety depressive disorder      GI   (+) GERD without esophagitis   (+) Irritable bowel syndrome with constipation   (+) Irritable bowel syndrome with diarrhea      Endocrine   (+) Thyromegaly      ID   (+) Common wart      Nervous   (+) Chronic motor tic   (+) RLS (restless legs syndrome)      Mental Health   (+) Functional neurological symptom disorder (conversion disorder), with abnormal movement       Clinical information reviewed:   Tobacco  Allergies  Meds   Med Hx  Surg Hx  OB Status  Fam Hx  Soc   Hx        NPO Detail:  NPO/Void Status  Date of Last Liquid: 11/19/24  Time of Last Liquid: 0000  Date of Last Solid: 11/18/24         Physical Exam    Airway  Mallampati: I  TM distance: >3 FB  Neck ROM: full     Cardiovascular - normal exam     Dental - normal exam     Pulmonary - normal exam     Abdominal - normal exam       Other findings: Recent cold has sniffles and scratchy throat now; afebrile; denies coughing      Anesthesia Plan    History of general anesthesia?: yes  History of complications of general anesthesia?: no    ASA 2     MAC   (Preoxygenated 2L prior to procedure.  Patient positioned self to comfort prior to sedation administered; eyes closed; continuous monitoring.)  The patient is not a current smoker.    intravenous induction   Anesthetic plan and risks discussed with patient.    Plan discussed with CRNA.

## 2024-11-20 NOTE — ANESTHESIA POSTPROCEDURE EVALUATION
Patient: Erica Aragon    Procedure Summary       Date: 11/20/24 Room / Location: [facility]    Anesthesia Start: 1258 Anesthesia Stop:     Procedures:       EGD      COLONOSCOPY Diagnosis:       Abdominal pain, unspecified abdominal location      Hematochezia    Scheduled Providers: Alexandre Kauffman MD; DENA Marie Responsible Provider: DENA Marie    Anesthesia Type: MAC ASA Status: 2            Anesthesia Type: MAC    Vitals Value Taken Time   /89 11/20/24 1338   Temp 36.7 11/20/24 1338   Pulse 82 11/20/24 1338   Resp 19 11/20/24 1338   SpO2 100 11/20/24 1338       Anesthesia Post Evaluation    Patient location during evaluation: bedside  Patient participation: complete - patient participated  Level of consciousness: awake  Pain score: 0  Pain management: adequate  Airway patency: patent  Cardiovascular status: acceptable  Respiratory status: acceptable and room air  Hydration status: acceptable  Postoperative Nausea and Vomiting: none      No notable events documented.

## 2024-11-20 NOTE — H&P
Patient is here     For endoscopic procedure.        Past medical history, past surgical history, social history, family history, allergies reviewed.         The note was created using voice recognition transcription software. Despite proofreading, unintentional typographical errors may be present. Please contact the GI office with any questions or concerns.     Current Medications: reviewed    Review of system performed    Follow up with GI was advised       Vital Signs: Reviewed    Physical Exam:  General: no apparent distress  Skin:  dry  HEENT:  mucous membranes moist  Neck:  atraumatic  Chest:  decreased air entry to auscultation bilaterally  CV:  positive S1 S2   Abdomen: no distension, soft, non-tender to palpation   Extremities: Chronic pigmentary changes  Neurological:  no asterixis      Investigations:  Labs, radiological imaging and cardiac work up were reviewed      Assessment and plan:  Proceed with  endoscopic procedure    ASA II airway II

## 2024-11-26 LAB
LABORATORY COMMENT REPORT: NORMAL
PATH REPORT.FINAL DX SPEC: NORMAL
PATH REPORT.GROSS SPEC: NORMAL
PATH REPORT.RELEVANT HX SPEC: NORMAL
PATH REPORT.TOTAL CANCER: NORMAL

## 2024-12-23 ENCOUNTER — APPOINTMENT (OUTPATIENT)
Dept: PHYSICAL MEDICINE AND REHAB | Facility: CLINIC | Age: 29
End: 2024-12-23
Payer: COMMERCIAL

## 2024-12-23 VITALS
HEIGHT: 63 IN | WEIGHT: 165 LBS | DIASTOLIC BLOOD PRESSURE: 82 MMHG | TEMPERATURE: 97.9 F | HEART RATE: 85 BPM | OXYGEN SATURATION: 97 % | BODY MASS INDEX: 29.23 KG/M2 | SYSTOLIC BLOOD PRESSURE: 123 MMHG

## 2024-12-23 DIAGNOSIS — F44.4 FUNCTIONAL NEUROLOGICAL SYMPTOM DISORDER (CONVERSION DISORDER), WITH ABNORMAL MOVEMENT: ICD-10-CM

## 2024-12-23 DIAGNOSIS — F41.8 MIXED ANXIETY DEPRESSIVE DISORDER: ICD-10-CM

## 2024-12-23 DIAGNOSIS — M79.18 MYOFASCIAL PAIN: Primary | ICD-10-CM

## 2024-12-23 DIAGNOSIS — F95.1 CHRONIC MOTOR TIC: ICD-10-CM

## 2024-12-23 DIAGNOSIS — M54.2 NECK PAIN: ICD-10-CM

## 2024-12-23 PROCEDURE — 99205 OFFICE O/P NEW HI 60 MIN: CPT | Performed by: PHYSICAL MEDICINE & REHABILITATION

## 2024-12-23 ASSESSMENT — PAIN SCALES - GENERAL: PAINLEVEL_OUTOF10: 5
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
## 2024-12-23 NOTE — PROGRESS NOTES
"Chief complaint:  neck and upper back pain    [Referred by friend, would like second opinion]    Dear Dr. Joya,    I had the pleasure of seeing your patient, Erica Aragon, in clinic today. As you know, She is a pleasant 29 y.o. right handed female, with past medical history significant for functional movement disorder, anxiety/depression, chronic motor tics, and chronic migraines who presents for evaluation of neck and upper back pain.    TIMELINE OF COMPLAINT(S):    Functional movement disorder diagnosed November 2021, symptoms began October 2021.  Overmedication, additional psych comorbids thought to be possible cause.  Was being seen by Dr. Shoemaker from [facility] neurology for FMD, last seen 6/5/24. Since starting a new job at  in November 2023, she was no longer able to see [facility] neurologist being to out of network. Last PT session ended October 2023 and she has not been able to establish with  provider to help with FMD.    Since starting at , she has seen neurologist for her FMD who referred her to psychiatry and psychology. The patient was not happy with this and did not think it was a comprehensive way to treat her FMD. She would like to resume all of her care that was provided at [facility] which included PT, medications, counseling and more.    She has past psychiatric history including an episode of being \"infested with millipedes\" at [facility] working at [employer]. She started seeing a psychologist and then went into inpatient psychiatric hospitalization in 2021. Few days after being home from this hospitalization she began having motor tics, specifically in neck.  Gradually she also began to have tremors in right arm and right leg. She attributes the neck pain from the tics, which first started after her stressful experience at psychiatric facility. She does not have any pain elsewhere, just upper back and neck area, and she never had this before this particular hospitalization. These issues have " continued to interrupt her life and interfere with IADLs and ADLs.     Note from acupuncturist on 10/10/2024 personally reviewed today.  Chronic neck pain, migraines and headaches.  Acupuncture performed, recommending 6-8 treatments, once per week.    Note from Dr. Baltazar, neurologist, on 7/24/2024 personally reviewed today.  Recommended headache expert for migraine management, Botox injections, integrative health referral for pain.    Note from Dr. Luis on 12/4/2023 personally reviewed today.  Discussed stress management techniques, therapy progress, and lifestyle changes to manage anxiety, tics and functional movement disorder.  Instructed to walk daily with socialization, no medication changes.    Pain:  LOCATION- Neck, head, back and bilateral shoulders  RADIATION- None  CONSTANT or INTERMITTENT- Constant  SEVERITY/QUANTITY- 8  QUALITY- aching and sharp and shooitng  WEAKNESS- Yes, arms and legs  NUMBNESS/TINGLING- Yes, arms and hands  ASSOCIATED WITH- Weakness and some hx of falls  EXACERBATED BY- Tics sitting, standing  BETTER WITH- Laying down  TRIED-      Anti-Inflammatories:      Muscle relaxants: Previously cyclobenzaprine, chlorzoxazone did not help      Anti-depressants:      Neuroleptics: Gabapentin 300 mg at night helps with RLS symptoms      LDN:    PHYSICAL THERAPY: Yes, Oct 2023 and has noticed significant improvement with sxs. States this provided biggest relief for her   CHIROPRACTIC MANIPULATION: Yes, has noticed significant relief  TENS unit: Yes  ACUPUNCTURE TREATMENTS: Yes   DEEP TISSUE MASSAGE THERAPY: Yes  OSTEOPATHIC MANIPULATION THERAPY: No    EMG/NCS: No    INJECTIONS: In the past she has received Botox with [facility] for migraine with Miladis Rahman CNP    IMAGING: No neck imaging aside from from the chiropractor    FUNCTIONAL HISTORY: The patient is independent in all ADLs, mobility, and driving. The patient does not use any assistive device.    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  Tobacco: No  Alcohol: Socially, 1-2 a month  Drugs: No  __________________________________    ROS: Admits to some falls in the past, specifically when playing sports like volleyball which she attributes to FMD. The patient denies any bowel or bladder incontinence/accidents, night sweats, fevers, chills, recent significant weight loss. A 14 point review of systems was reviewed with the patient and is as above and otherwise negative.  ROS questionnaire positive for headache, muscle pain/tightness, depression, anxiety    Physical Exam  Constitutional:           Comments: Neck, head, back and bilateral shoulder pain.          PHYSICAL EXAM    GEN - Alert, well-developed, well-nourished, no acute distress  PSYCH - Cooperative, appropriate mood and affect  HEENT - NC/AT  RESP - Non-labored respirations, equal expansion  CV - warm and well-perfused, No cyanosis or edema in extremities  ABD- soft, ND  SKIN - No rash.    NECK/EXTR- Cervical ROM is full with forward flexion, extension, rotation, and side bending with no pain. Spurlings and Lhermitte's negative. No tenderness of the cervical spinous processes. Mild tenderness of the cervical paraspinal muscles and trapezei musculature as well as the scapular musculature. Scapulae are symmetrical without evidence of medial or lateral winging.      NEURO -   UE strength 5/5 -  including shoulder abduction, biceps, triceps, wrist extensors, finger flexors, interossei, and    LE strength 5/5 -  including hip flexors, knee flexors, knee extensors, ankle dorsiflexors, ankle plantar flexors, and EHL   Sensation - intact to light touch in bilateral lower and upper extremities.   Reflexes - 2+ biceps, brachioradialis, triceps, patellar and Achilles reflexes bilaterally, Zarate's negative bilaterally  GAIT - Normal base, normal stride length, non-antalgic. Able to toe walk and heel walk without difficulty. Able to perform tandem  gait without difficulty.    IMPRESSION:    This is a pleasant 29 y.o. right handed female, with past medical history significant for functional movement disorder, anxiety/depression, chronic motor tics, and chronic migraines who presents for evaluation of neck and upper back pain.  Physical exam is reassuring for lack of neurologic compromise.  Despite all the above psychiatric and psychological conditions, the patient has an isolated issue of chronic neck and upper back pain that she attributes to stem from her tic disorder that she developed after a particular stressful psychiatric hospitalization.  At this point, she feels that this is the most limiting to her day-to-day functioning and we discussed various options to help with this.  Etiology is likely myofascial pain/tension, versus underlying (likely mild) spondylosis.      1. Patient Education: Extensive time was spent educating the patient on relevant anatomy, clinical findings and imaging, as well as discussing the potential diagnoses as discussed above.  Referral to psychology and psychiatry provided. Also educated regarding [employer]     2. Pharmacology: Continue gabapentin at this time. Consider other medications in future.     3. Exercise: The patient was given a prescription for PT to have a refresher course regarding core strengthening. Modalities such as US, heat/ice, TENS to decrease pain can also be employed. We discussed the importance of maintaining a daily exercise program, including stretching and strengthening. Preventative strategies were reviewed, specifically avoidance of any exercises that exacerbate pain.     4. Imaging: Cervical XR ordered     5. Interventional: Consider future TPI to cervical paraspinals, most significant to left cervicals     6. Return to clinic for follow-up with me in 6-8 weeks or sooner as needed.        The patient expressed understanding and agreement with the assessment and plan. Patient encouraged to  contact us should they have any questions, concerns, or any changes in symptoms.      Thank you for allowing me to participate in the care of your patient.         Aman Melton, DO  PM&R, PGY-2      Patient seen and discussed with the resident. I agree with the above assessment and plan.          ** Dictated with voice recognition software, please forgive any errors in grammar and/or spelling **

## 2024-12-23 NOTE — PATIENT INSTRUCTIONS
-Continue gabapentin as it works best for you for now but consider other medications in the future  -Neck x-ray ordered  -Physical therapy referral provided  -Do daily home exercises.  -Referral to psychology and psychiatry provided, but you can also [email or web address] for other referral options. Try CBT, DBT, EMDR  -Consider trigger point injections, handout provided  -Follow-up in 6 to 8 weeks, we can consider trigger point injections at this visit if needed

## 2025-01-20 ENCOUNTER — EVALUATION (OUTPATIENT)
Dept: PHYSICAL THERAPY | Facility: CLINIC | Age: 30
End: 2025-01-20
Payer: COMMERCIAL

## 2025-01-20 DIAGNOSIS — M79.18 MYOFASCIAL PAIN: ICD-10-CM

## 2025-01-20 DIAGNOSIS — M54.2 NECK PAIN: ICD-10-CM

## 2025-01-20 PROCEDURE — 97161 PT EVAL LOW COMPLEX 20 MIN: CPT | Mod: GP

## 2025-01-20 PROCEDURE — 97110 THERAPEUTIC EXERCISES: CPT | Mod: GP

## 2025-01-20 SDOH — ECONOMIC STABILITY: FOOD INSECURITY: WITHIN THE PAST 12 MONTHS, THE FOOD YOU BOUGHT JUST DIDN'T LAST AND YOU DIDN'T HAVE MONEY TO GET MORE.: NEVER TRUE

## 2025-01-20 SDOH — ECONOMIC STABILITY: FOOD INSECURITY: WITHIN THE PAST 12 MONTHS, YOU WORRIED THAT YOUR FOOD WOULD RUN OUT BEFORE YOU GOT MONEY TO BUY MORE.: NEVER TRUE

## 2025-01-20 ASSESSMENT — PATIENT HEALTH QUESTIONNAIRE - PHQ9
SUM OF ALL RESPONSES TO PHQ9 QUESTIONS 1 AND 2: 0
2. FEELING DOWN, DEPRESSED OR HOPELESS: NOT AT ALL
1. LITTLE INTEREST OR PLEASURE IN DOING THINGS: NOT AT ALL

## 2025-01-20 NOTE — PROGRESS NOTES
Physical Therapy    Physical Therapy Evaluation and Treatment      Patient Name: Erica Aragon  MRN: [id number]  Today's Date: 1/20/2025  Time Calculation  Start Time: 1440  Stop Time: 1519  Time Calculation (min): 39 min    Insurance - reviewed   Visit number: 1 (updated 01/20/25)   Payor:  EMPLOYEE MEDICAL PLAN / Plan:  EMPLOYEE MEDICAL PLAN CONSUMER SELECT / Product Type: *No Product type* /    EVAL $178.00 / 30 PT/OT V PCY 0 USED NEEDS AUTH CONTIGO PAYS AT 90% AFTER DED 1750.00 29.64 APPLIED OOP 2550.00 29.64 APPLIED   Referring Provider: Sayra Asencio MD       Assessment:  Erica Aragon  is a 29 y.o. old patient who participated in a physical therapy evaluation today due to R sided neck and upper back pain. Significant medical history of functional movement disorder:  experiences tics in which she quickly rotates head towards R side. Previously attended PT at another location with improvement- had to discontinue care due to insurance changes. Erica presents with signs and symptoms consistent with R sided cervical radiculopathy (positive Spurling's test and relief with cervical distraction). Pauls impairments include: postural deficits, pain, decreased strength, and decreased range of motion.  Due to these impairments, she has the following functional limitations and participation restrictions: difficulty performing household activities, difficulty performing recreational activities, difficulty performing occupational activities, and difficulty with sleeping. Pauls clinical presentation is Stable and/or uncomplicated characteristics with the level of complexity being low. Pauls potential for rehab is good.  Skilled physical therapy services are appropriate and beneficial in order to achieve measurable and meaningful change in the objective tests and measures. Utilization of skilled physical therapy services will aid in advancing her functional status and attaining her therapy-related goals.  Erica verbalized understanding and is in agreement with all goals and plan of care.  Erica left session with all questions answered and no increase in symptoms.      PT Assessment  PT Assessment Results: Decreased strength, Decreased range of motion, Pain  Rehab Prognosis: Good     Plan: manual interventions for pain control + progress postural strength as able  OP PT Plan  Treatment/Interventions: Aquatic therapy, Cryotherapy, Dry needling, Education/ Instruction, Electrical stimulation, Gait training, Hot pack, Manual therapy, Mechanical traction, Neuromuscular re-education, Self care/ home management, Taping techniques, Therapeutic activities, Therapeutic exercises, Work conditioning  PT Plan: Skilled PT  PT Frequency: 1 time per week  Duration: 6V  Rehab Potential: Good  Plan of Care Agreement: Patient    Current Problem:   Problem List Items Addressed This Visit             ICD-10-CM       Musculoskeletal and Injuries    Neck pain M54.2    Relevant Orders    Follow Up In Physical Therapy    Myofascial pain M79.18    Relevant Orders    Follow Up In Physical Therapy         Subjective      Erica Aragon  is a 29 y.o. female  presenting to the clinic with chief concern of neck and upper back pain. Diagnosed with FMD in November 2021- experiences tics in which she quickly rotates head towards R side. 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 Symptoms previously improved with PT at [facility]: recalls distraction techniques and exercises. Feels that she has weakened since since stopping therapy: now dropping items when attempting to use R hand. Recently experiencing weakness in RLE as well- limits confidence during volleyball. Currently seeing chiropractor with good relief for multiple days after session.    Erica Aragon reports symptoms worsened 2-3 months ago    Reports symptoms are better with laying down/rest    Reports  "symptoms are worse with driving    Erica Aragon  denies:  diplopia, drop attacks, dysarthria, dysphagia, dizziness, saddle anesthesia, bowel changes, bladder changes, unexpected weight loss within the past 4 weeks, and unexpected weight gain within the past 4 weeks    Erica Aragon  confirms: intermittent numbness and tingling 2-3x week     Diagnostic images  None    Medical History Form: Reviewed (scanned into chart)    Living Environment: multi-level house- lives with parents    Occupation: Employed full-time-      Prior Level of Function: volleyball 1x week, has not attended gym in 1 year, worked 6 hours in-person before returning home to finish job due to symptoms    Exercise: not at all- discontinued due to pain    Functional Limitations: exercise, recreational activities (volleyball), occupational tasks, driving    Pt goals for therapy:  \"return to working out at gym\"    Precautions:  Fall Risk: Low- reports RLE dragging  Denies: Cancer, Pacemaker, Diabetes, Hypertension, latex allergy, epilepsy/seizures, other known cardiac problems, other known neurologic problem, other known pulmonary problems, unexpected weight gain or loss, saddle anesthesia, night sweats, night pain, diplopia, and drop attacks  Past Surgical history: none  Past Medical history: functional movement disorder, anxiety/depression, chronic motor tics, OCD, and chronic migraines     Pain:  6/10 current, 4/10 best, and 9/10 worst  pain location: suboccipitals > R UT      Objective     Functional Assessments  Transfers: IND  Gait: IND  Bed mobility: IND    Numbness/Tingling/Paresthesia: denies currently     Dermatomes B UE:   grossly intact with eyes closed to light touch      RANGE OF MOTION: AROM in sitting via goniometer in degrees, * indicates pain  Cervical Flexion: 32*  Cervical Extension: 40*  Cervical Rotation R/L: 52*/51*  Cervical Side Bend R/L: 30/29    Strength/Myotomes: MMT in sitting, * indicates " pain  Shoulder Elevation (C4) R/L: 5/5  Shoulder Abduction (C5) R/L: 3+/3+  Elbow Flexion (C6) R/L: 4/4  Wrist Ext (C6) R/L: 4+/4+  Elbow Extension (C7) R/L: 4/4  Wrist Flexion (C7) R/L: 4/4  Thumb Ext (C8) R/L: 4/4  Ulnar Deviation (C8) R/L: 4/4  Hand Intrinsics- abd (T1) R/L: 4/4    Special Tests:   Spurlings (nerve root compression): right positive, left negative  Distraction:  positive  Clonus: negative  Zartae's:  negative    Palpation: significant tightness in R cervical paraspinals and UT    Outcome Measures:  Other Measures  Neck Disability Index: 19     Treatments:    Therapeutic Exercise:  8 minutes    Access Code: [email or web address]  - Supine Deep Neck Flexor Nods - 2 sets - 10 reps  - Supine Shoulder Horizontal Abduction with YELLOW Resistance  - 2 sets - 10 reps *mild increase in pain, instructed to discontinue band at home and stop exercise if increased symptoms persist*  Education on suspected cervical radiculopathy      EDUCATION:  Outpatient Education  Individual(s) Educated: Patient  Education Provided: POC, Anatomy, Home Exercise Program  Risk and Benefits Discussed with Patient/Caregiver/Other: yes  Patient/Caregiver Demonstrated Understanding: yes  Plan of Care Discussed and Agreed Upon: yes  Patient Response to Education: Patient/Caregiver Verbalized Understanding of Information, Patient/Caregiver Performed Return Demonstration of Exercises/Activities    -Educated Erica  on the role of PT and PT POC  -Educated Erica regarding benefit and purpose of skilled PT services along with results of examination findings and how this correlates to their chief concern  - Education on cervical radiculopathy   -Answered Erica's questions in full  -Educated Erica on relevant anatomy using anatomy images and the rationale for exercises  -Erica Aragon advised to hold any ex if it increases pain, Erica Aragon verbalized understanding    Goals:  STG's (within 2 weeks)  Erica Aragon will decrease pain by >/=  2/10 points from baseline to improve ability to perform household/recreational activities.    Erica Aragon will demonstrate independence,  excellent understanding of and report compliance with at least 3 exercises in her HEP to facilitate the learning process to maximize PT benefits and to facilitate independent rehab program upon discharge.    LTG's (by discharge)  Erica Aragon will decrease pain to 0-4/10 to improve ability to perform household/recreational/occupational activities.    Erica Aragon will score < 5 points on NDI to indicate no disability during performance of everyday tasks.     Erica Aragon will improve cervical rot by at least 5 deg to assist with head turns during driving.    Erica Aragon will report at least 75% reduction in RUE paresthesias to reduce with dropping items at work and spilling beverages.    Erica Aragon will voice confidence and no increased pain beyond baseline with physical activity for participation in volleyball and return to gym.     Erica Aragon will demonstrate independence,  excellent understanding of and report compliance with HEP to facilitate the learning process to maximize PT benefits and to facilitate independent rehab program upon discharge.      Time Calculation  Start Time: 1440  Stop Time: 1519  Time Calculation (min): 39 min  PT Evaluation Time Entry  PT Evaluation (Low) Time Entry: 31  PT Therapeutic Procedures Time Entry  Therapeutic Exercise Time Entry: 8

## 2025-01-29 ENCOUNTER — TREATMENT (OUTPATIENT)
Dept: PHYSICAL THERAPY | Facility: CLINIC | Age: 30
End: 2025-01-29
Payer: COMMERCIAL

## 2025-01-29 DIAGNOSIS — M79.18 MYOFASCIAL PAIN: ICD-10-CM

## 2025-01-29 DIAGNOSIS — M54.2 NECK PAIN: ICD-10-CM

## 2025-01-29 PROCEDURE — 97110 THERAPEUTIC EXERCISES: CPT | Mod: GP

## 2025-01-29 PROCEDURE — 97140 MANUAL THERAPY 1/> REGIONS: CPT | Mod: GP

## 2025-01-29 NOTE — PROGRESS NOTES
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 / Product Type: *No Product type* /    EVAL $178.00 / 30 PT/OT V PCY 0 USED NEEDS AUTH CONTIGO PAYS AT 90% AFTER DED 1750.00 29.64 APPLIED OOP 2550.00 29.64 APPLIED   Referring Provider: Sayra Asencio MD       Current Problem  Problem List Items Addressed This Visit             ICD-10-CM       Musculoskeletal and Injuries    Neck pain M54.2    Myofascial pain M79.18         Precautions  Fall Risk: Low- reports RLE dragging  Denies: Cancer, Pacemaker, Diabetes, Hypertension, latex allergy, epilepsy/seizures, other known cardiac problems, other known neurologic problem, other known pulmonary problems, unexpected weight gain or loss, saddle anesthesia, night sweats, night pain, diplopia, and drop attacks  Past Surgical history: none  Past Medical history: functional movement disorder, anxiety/depression, chronic motor tics, OCD, and chronic migraines     General  Subjective      Erica Aragon denies any falls or complications since last session. Erica Aragon reports increased pain and soreness with exercises assigned at initial eval.     Erica Aragon reports fair compliance with HEP- limited by 15 day migraine    Pain:  6/10  Pain location: suboccipitals > beto UT      Objective     Treatments:  Abbreviation Key  NP = not performed this visit   NV = next visit  // = parallel    Assigned HEP- Medbridge Access Code: [email or web address]     Therapeutic Exercise:  5 minutes    Upper Cervical Extension SNAG with Strap  x20 reps  Upper Cervical Rotation SNAG with Strap  x20 reps    Manual Therapy:  34 minutes   STM over beto suboccipitals. Performed with pt in supine  Cervical PA mobs grades  I-II. Performed in prone *decreased mobility in spring testing in C3-C4*  STM over beto UT and levator scap. Performed in seated      Outpatient Education: Reviewed home exercise program. Provided verbal feedback to improve exercise technique.   Erica Aragon verbalizes understanding of all education provided: Yes    Assessment:  Patient arrived late, session completed in available time. Erica Aragon completed treatment today which focused upon manual interventions + cervical SNAGS in order to address ongoing neck pain. Significant medical history of FMD.  Erica presents with significant tightness throughout R cervical paraspinals, levator scap, and UT. Minimal tightness on L side. Erica requires min verbal + visual cueing for appropriate technique during there-ex. Erica's  response to tx was:  Improved soft tissue mobility and reduced pain rated as 4.5/10 exiting clinic.    Erica's Progress towards goals: Patient reports there has not been a significant change in functional abilities. Erica Aragon continues to have decreased strength, decreased ROM, and pain limiting participation in recreational activities and occupational tasks Further skilled therapy services are warranted to address the remaining impairments in order to progress towards functional goals. Erica left session with all questions answered and no increase in symptoms.      Plan:  manual interventions for pain control + progress postural strength as able.      Time Calculation  Start Time: 1606  Stop Time: 1645  Time Calculation (min): 39 min     PT Therapeutic Procedures Time Entry  Manual Therapy Time Entry: 34  Therapeutic Exercise Time Entry: 5

## 2025-02-04 ENCOUNTER — TREATMENT (OUTPATIENT)
Dept: PHYSICAL THERAPY | Facility: CLINIC | Age: 30
End: 2025-02-04
Payer: COMMERCIAL

## 2025-02-04 DIAGNOSIS — M79.18 MYOFASCIAL PAIN: ICD-10-CM

## 2025-02-04 DIAGNOSIS — M54.2 NECK PAIN: Primary | ICD-10-CM

## 2025-02-04 PROCEDURE — 97140 MANUAL THERAPY 1/> REGIONS: CPT | Mod: GP | Performed by: GENERAL ACUTE CARE HOSPITAL

## 2025-02-04 PROCEDURE — 97530 THERAPEUTIC ACTIVITIES: CPT | Mod: GP | Performed by: GENERAL ACUTE CARE HOSPITAL

## 2025-02-04 PROCEDURE — 97110 THERAPEUTIC EXERCISES: CPT | Mod: GP | Performed by: GENERAL ACUTE CARE HOSPITAL

## 2025-02-04 NOTE — PROGRESS NOTES
Physical Therapy    Physical Therapy Treatment    Patient Name: Erica Aragon  MRN: [id number]  Today's Date: 2/4/2025       Insurance - reviewed   Visit number: 3 (updated 02/05/25)   Payor:  EMPLOYEE MEDICAL PLAN / Plan:  EMPLOYEE MEDICAL PLAN [employer] / Product Type: *No Product type* /    EVAL $178.00 / 30 PT/OT V PCY 0 USED NEEDS AUTH CONTIGO PAYS AT 90% AFTER DED 1750.00 29.64 APPLIED OOP 2550.00 29.64 APPLIED   Referring Provider: Sayra Asencio MD       Current Problem  Problem List Items Addressed This Visit             ICD-10-CM    Neck pain - Primary M54.2    Myofascial pain M79.18           Precautions  Fall Risk: Low- reports RLE dragging  Denies: Cancer, Pacemaker, Diabetes, Hypertension, latex allergy, epilepsy/seizures, other known cardiac problems, other known neurologic problem, other known pulmonary problems, unexpected weight gain or loss, saddle anesthesia, night sweats, night pain, diplopia, and drop attacks  Past Surgical history: none  Past Medical history: functional movement disorder, anxiety/depression, chronic motor tics, OCD, and chronic migraines     General  Subjective      Erica Aragon denies any falls or complications since last session. Erica Aragon reports that she is still in her migraine cycle which affects her pain.  She does report difficulty grasping objects at time on the R.  She is right handed.    Erica Aragon reports compliance with HEP but has limited some reps due to pain    Pain:  6/10  Pain location: R>L posterior cervical musculature > UT    Cleveland Cardoso, SPT, participated during session.  IRossy, PT, DPT, NCS directly supervised during session.       Objective      Strength (2nd notch)   R UE: 15 kg, 18 kg, 14 kg  L UE: 20 kg, 20 kg, 20 kg    Treatments:  Abbreviation Key  NP = not performed this visit   NV = next visit    Assigned HEP- Medbridge Access Code: [email or web address]     Therapeutic Exercise:  15 minutes    - Supine Deep Neck Flexor  Nods - 2 sets - 10 reps  Changed to sitting x 10 reps  - Supine Shoulder Horizontal Abduction with YELLOW Resistance  - 2 sets - 10 reps   Reviewed only and OK to continue  Upper Cervical Extension SNAG with Strap  x20 reps  Held due to increase in pain per subjective  Upper Cervical Rotation SNAG with Strap  x20 reps  Held due to increase in pain per subjective  Also reviewed some ex issued by chiro including chin tuck, ok to perform ex as long as no increase in symptoms.  Erica to hold any ex that increases symptoms  Held extension nods with focus on upper cervical mobility as this was most provocative ex    Manual Therapy:  10 minutes   Performed with pt in supine  Manual intermittent cervical distraction - good relief   Supine suboccipital release - good relief  Resume the following next session as good relief  Cervical PA mobs grades I-II. Performed in prone *decreased mobility in spring testing in C3-C4*    Therapeutic Activity: 20 minutes  Time spent on  strength testing  Answered Ericacristiane Aragon's questions in full.  Reviewed relevant anatomy pathophysiology of FND vs cervical radiculopathy, and evidence based management for each  Reviewed some current strategies to address her FND symptoms which include distraction of some sort  Time spent on reviewing patient information websites that can help manage including FND hope  Rationale for manual techniques and strengthening ex      Outpatient Education: Reviewed and updated home exercise program based on subjective. Provided verbal feedback to improve exercise technique.   Erica Aragon verbalizes understanding of all education provided: Yes    Assessment:    Erica Aragon completed treatment today which focused upon HEP review/modifications to address pain reported in subjective comments.  Also performed manual techniques in order to address ongoing pain.  Erica presents with forward head posture and noted increased tightness/compensation in  levator/UT most likely due to weakness noted on eval in B shoulder abduction.   Erica requires cues for proper technique with HEP and removed ex from HEP that were provocative most likely due to suboccipital dysfunction.  Erica's  response to tx was: Patient tolerated therapeutic interventions well and without any adverse events. Reduced pain post treatment.. Erica's Progress towards goals: Patient reports there has not been a significant change in functional abilities.. Erica Aragon continues to have decreased strength, decreased ROM, and pain limiting participation in household chores and recreational activities. Further skilled therapy services are warranted to address the remaining impairments in order to progress towards functional goals. Erica left session with all questions answered and no increase in symptoms.      Plan:  manual interventions for pain control + progress postural strength as able.      Time Calculation  Start Time: 1645  Stop Time: 1730  Time Calculation (min): 45 min     PT Therapeutic Procedures Time Entry  Manual Therapy Time Entry: 10  Therapeutic Exercise Time Entry: 15  Therapeutic Activity Time Entry: 20

## 2025-02-11 ENCOUNTER — TREATMENT (OUTPATIENT)
Dept: PHYSICAL THERAPY | Facility: CLINIC | Age: 30
End: 2025-02-11
Payer: COMMERCIAL

## 2025-02-11 DIAGNOSIS — M54.2 NECK PAIN: ICD-10-CM

## 2025-02-11 DIAGNOSIS — M79.18 MYOFASCIAL PAIN: ICD-10-CM

## 2025-02-11 PROCEDURE — 97140 MANUAL THERAPY 1/> REGIONS: CPT | Mod: GP

## 2025-02-11 PROCEDURE — 97110 THERAPEUTIC EXERCISES: CPT | Mod: GP

## 2025-02-11 NOTE — PROGRESS NOTES
Physical Therapy    Physical Therapy Treatment    Patient Name: Erica Aragon  MRN: [id number]  Today's Date: 2/11/2025  Time Calculation  Start Time: 1605  Stop Time: 1646  Time Calculation (min): 41 min    Insurance - reviewed   Visit number: 4 (updated 02/11/25)   Payor:  EMPLOYEE MEDICAL PLAN / Plan:  EMPLOYEE MEDICAL PLAN [employer]SUM[employer] / Product Type: *No Product type* /    EVAL $178.00 / 30 PT/OT V PCY 0 USED NEEDS AUTH CONTIGO PAYS AT 90% AFTER DED 1750.00 29.64 APPLIED OOP 2550.00 29.64 APPLIED   Referring Provider: Sayra Asencio MD       Current Problem  Problem List Items Addressed This Visit             ICD-10-CM       Musculoskeletal and Injuries    Neck pain M54.2    Myofascial pain M79.18         Precautions  Fall Risk: Low- reports RLE dragging  Denies: Cancer, Pacemaker, Diabetes, Hypertension, latex allergy, epilepsy/seizures, other known cardiac problems, other known neurologic problem, other known pulmonary problems, unexpected weight gain or loss, saddle anesthesia, night sweats, night pain, diplopia, and drop attacks  Past Surgical history: none  Past Medical history: functional movement disorder, anxiety/depression, chronic motor tics, OCD, and chronic migraines     General  Subjective      Erica Aragon denies any falls or complications since last session. Erica Aragon reports pain has returned to baseline level with HEP updates last session. Migraine went away for a few days but has since returned since last Saturday. Believes that the taping performed last session helped a little. Feels that massage has been the most beneficial but only lasts 1-2 days. Feels worse immediately after PT, however, the next few days are better.     Erica Aragon reports fair compliance with HEP- performing every other day due to migraine    Pain:  5/10  Pain location: R>L posterior cervical musculature > UT      Objective       Treatments:  Abbreviation Key  NP = not performed this visit   NV = next  visit    Assigned [facility] Access Code: [email or web address]     Therapeutic Exercise:  8 minutes    Gentle Levator Scapulae Stretch  - 1 sets - 3 reps - 30 sec hold each side  Seated Upper Trap Stretch - 1 sets - 3 reps - 30 sec hold each side  Patient Education: Office Posture  Patient admits to laying in bed when tired. Encouraged increasing time spent at desk in good posture to build functional muscular endurance  NP  Supine Deep Neck Flexor Nods - 2 sets - 10 reps  Changed to sitting x 10 reps  Supine Shoulder Horizontal Abduction with YELLOW Resistance  - 2 sets - 10 reps   Reviewed only and OK to continue    Manual Therapy:  33 minutes   Performed with pt in supine  Manual intermittent cervical distraction - good relief   Suboccipital release - good relief  STM over beto cervical paraspinals  NP  Cervical PA mobs grades I-II. Performed in prone *decreased mobility in spring testing in C3-C4*       Outpatient Education: Reviewed and updated home exercise program. Provided verbal feedback to improve exercise technique.   Erica Aragon verbalizes understanding of all education provided: Yes    Assessment:    Erica Aragon completed treatment today which focused upon manual interventions + stretching in order to address ongoing neck pain. Erica presents with mild forward head and rounded shoulders posture. Reports laying in bed to complete work tasks at times. Education provided on office posture and extended time spent at desk to increase functional endurance of muscles. No increased pain with attempts of levator scap and UT stretching- updated HEP. Continued tenderness in suboccipitals and mild tightness in UT and cervical paraspinals- improved soft tissue mobility without change in pain. Erica's  response to tx was: Patient tolerated therapeutic interventions well and without any adverse events. Erica's Progress towards goals: Patient reports there has not been a significant change in functional abilities.  Erica Aragon continues to have decreased strength, decreased ROM, and pain limiting participation in household chores and recreational activities. Further skilled therapy services are warranted to address the remaining impairments in order to progress towards functional goals. Erica left session with all questions answered and no increase in symptoms.      Plan:  Manual interventions for pain control + progress postural strength as able. Patient interested in returning to gym- instructed patient to come prepared with any questions next session .      Time Calculation  Start Time: 1605  Stop Time: 1646  Time Calculation (min): 41 min     PT Therapeutic Procedures Time Entry  Manual Therapy Time Entry: 33  Therapeutic Exercise Time Entry: 8

## 2025-02-17 ENCOUNTER — HOSPITAL ENCOUNTER (OUTPATIENT)
Dept: RADIOLOGY | Facility: HOSPITAL | Age: 30
Discharge: HOME | End: 2025-02-17
Payer: COMMERCIAL

## 2025-02-17 DIAGNOSIS — M54.2 NECK PAIN: ICD-10-CM

## 2025-02-17 PROCEDURE — 72040 X-RAY EXAM NECK SPINE 2-3 VW: CPT | Performed by: RADIOLOGY

## 2025-02-17 PROCEDURE — 72040 X-RAY EXAM NECK SPINE 2-3 VW: CPT

## 2025-02-18 ENCOUNTER — TREATMENT (OUTPATIENT)
Dept: PHYSICAL THERAPY | Facility: CLINIC | Age: 30
End: 2025-02-18
Payer: COMMERCIAL

## 2025-02-18 DIAGNOSIS — M79.18 MYOFASCIAL PAIN: ICD-10-CM

## 2025-02-18 DIAGNOSIS — M54.2 NECK PAIN: ICD-10-CM

## 2025-02-18 PROCEDURE — 97140 MANUAL THERAPY 1/> REGIONS: CPT | Mod: GP | Performed by: GENERAL ACUTE CARE HOSPITAL

## 2025-02-18 PROCEDURE — 97530 THERAPEUTIC ACTIVITIES: CPT | Mod: GP | Performed by: GENERAL ACUTE CARE HOSPITAL

## 2025-02-18 PROCEDURE — 97112 NEUROMUSCULAR REEDUCATION: CPT | Mod: GP | Performed by: GENERAL ACUTE CARE HOSPITAL

## 2025-02-18 NOTE — PROGRESS NOTES
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[employer] / Product Type: *No Product type* /    EVAL $178.00 / 30 PT/OT V PCY 0 USED NEEDS AUTH CONTIGO PAYS AT 90% AFTER DED 1750.00 29.64 APPLIED OOP 2550.00 29.64 APPLIED   Referring Provider: Sayra Asencio MD       Current Problem  Problem List Items Addressed This Visit             ICD-10-CM    Neck pain M54.2    Myofascial pain M79.18           Precautions  Fall Risk: Low- reports RLE dragging  Denies: Cancer, Pacemaker, Diabetes, Hypertension, latex allergy, epilepsy/seizures, other known cardiac problems, other known neurologic problem, other known pulmonary problems, unexpected weight gain or loss, saddle anesthesia, night sweats, night pain, diplopia, and drop attacks  Past Surgical history: none  Past Medical history: functional movement disorder, anxiety/depression, chronic motor tics, OCD, and chronic migraines     General  Subjective      Erica Aragon denies any falls or complications since last session. Erica Aragon reports good effect from stretching exercises. No migraines since last visit. Feels like her head movement to her right side is limited but after a \"popping\" sensation feels better.    Erica Aragon reports fair compliance with HEP    Pain:  4/10  Pain location: R=L posterior cervical musculature > UT    Cleveland Cardoso, SPT, participated in this sesion under direct supervision of Rossy Bobby, PT, DPT, NCS    Objective     Palpation:   - posterior deep cervical neck extensor musculature tightness L>R   - C3-C4 spinous process tenderness   - C6-C7 spinous process tenderness   - suboccipital musculature tenderness    Treatments:  Abbreviation Key  NP = not performed this visit   NV = next visit    Assigned HEP- Medbridge Access " Code: [email or web address]   Therapeutic Activity: 8 minutes   - seated workplace simulation (asked pt. to sit in chair and simulate work posture at computer with cart in front of her)    - modifications made:     - towel roll to increase lumbar lordosis     - working in one area and trying to limit how much her head is turning on her body with multiple screens     - using adjustable height of desk/chair or a step for feet to rest on for support     - setting screen at just below eye level (also for mobile device)      - setting a timer for a couple hours time to get up and perform HEP     - Pt. given handout with ergonomic workplace modifications to follow    -reviewed options of OT/SLP and role of each. Erica to pursue via direct access if desired.    Neuromuscular Re-education: 17 minutes    - functional UE PNF patterns for UE in standing (without pain) for movement retraining using mirror for feedback     - L UE into D1 and D2 PNF pattern (no pain)  - R UE into D1 and D2 PNF pattern (no pain)  - alternating (and opposite) PNF patterns performed with small ball 10 x to each side x 2 sets (no pain)  - min verbal cuing to avoid breath-holding during activity  - diaphragmatic breathing 1 minute x 8 reps     Manual Therapy:  15 minutes   Performed with pt in supine  Manual intermittent cervical distraction - good relief   Suboccipital release - good relief  STM over beto cervical paraspinals  Cervical PA mobs grades I-II (focused on lower cervical spinous processes). Performed in supine.   Cervical up-gliding mobs grades I-II (focused on C3). Performed in supine - good relief  Applied B Kinesiotape to posterior cervical paraspinal musculature (instructed pt. on how to perform at home)      Outpatient Education: Reviewed and updated home exercise program. Provided verbal feedback to improve exercise technique.   The rationale for kinesiotape application was discussed with Erica Aragon.  she was advised that the tape will  generally stay on for 2-3 days.  If skin irritation, such as redness or itching were to occur, Erica Aragon was advised to remove the tape and gently wash the adhesive off.  she agreed to kinesiotaping.   Erica Aragon verbalizes understanding of all education provided: Yes    Assessment:    Erica Aragon completed treatment today which focused upon therapeutic activity simulating work environment set-up, movement retraining and manual interventions in order to address ongoing neck pain. Erica continues to present with mild forward head and rounded shoulders posture. Education, instruction and simulation provided on office posture and extended time spent at desk to increase functional endurance of muscles but, to allow for breaks every couple of hours. Erica noted decreased pain with incorporation of stretching of levator scap and UT with updated HEP and no symptoms of migraines demonstrating a good effect with intervention thus far. Erica continues to have tenderness in suboccipitals and mild-moderate tightness in UT and cervical paraspinals but, with improved soft tissue mobility in levator and UT without change in pain.  Erica had increased tightness/tone in B DNF L>R despite manual techniques.  The pt. showed a positive response to movement retraining exercise without pain demonstrating a promising capacity for improvement with this intervention. Erica's  response to tx was: Patient tolerated therapeutic interventions well and without any adverse events. Erica's Progress towards goals: Patient reports there has not been a significant change in functional abilities. Erica Aragon continues to have decreased strength, decreased ROM, and pain limiting participation in household chores and recreational activities. Further skilled therapy services are warranted to address the remaining impairments in order to progress towards functional goals. Erica left session with all questions answered, no  increase in symptoms and decreased pain at 3/10 from 4/10 at the start of the treatment session.      Plan:  Manual interventions for pain control + progress postural strength as able. Patient interested in returning to gym- instructed patient to come prepared with any questions next session . Consider incorporation sensory integration exercise (coordination/proprioceptive). Consider more movement retraining exercise      Time Calculation  Start Time: 1637  Stop Time: 1717  Time Calculation (min): 40 min     PT Therapeutic Procedures Time Entry  Manual Therapy Time Entry: 15  Neuromuscular Re-Education Time Entry: 17  Therapeutic Activity Time Entry: 8

## 2025-02-18 NOTE — PATIENT INSTRUCTIONS
-Continue gabapentin as it works best for you for now but consider other medications in the future  -Continue working with a  focusing on core and upper body/shoulder girdle strengthening, avoid any movement or activity that reproduce or cause neck or head pain.  -Previously referral to psychology and psychiatry provided, Try CBT, DBT, EMDR  -Consider trigger point injections, handout provided previously  -Follow-up 3 months but message me sooner if needed;, we can consider trigger point injections at this visit if needed

## 2025-02-19 ENCOUNTER — APPOINTMENT (OUTPATIENT)
Dept: PHYSICAL MEDICINE AND REHAB | Facility: CLINIC | Age: 30
End: 2025-02-19
Payer: COMMERCIAL

## 2025-02-19 VITALS
OXYGEN SATURATION: 97 % | SYSTOLIC BLOOD PRESSURE: 124 MMHG | DIASTOLIC BLOOD PRESSURE: 87 MMHG | TEMPERATURE: 97.5 F | HEIGHT: 63 IN | WEIGHT: 165 LBS | HEART RATE: 77 BPM | BODY MASS INDEX: 29.23 KG/M2

## 2025-02-19 DIAGNOSIS — M54.2 NECK PAIN: ICD-10-CM

## 2025-02-19 DIAGNOSIS — M79.18 MYOFASCIAL PAIN: Primary | ICD-10-CM

## 2025-02-19 DIAGNOSIS — F44.4 FUNCTIONAL NEUROLOGICAL SYMPTOM DISORDER (CONVERSION DISORDER), WITH ABNORMAL MOVEMENT: ICD-10-CM

## 2025-02-19 DIAGNOSIS — F41.8 MIXED ANXIETY DEPRESSIVE DISORDER: ICD-10-CM

## 2025-02-19 DIAGNOSIS — F95.1 CHRONIC MOTOR TIC: ICD-10-CM

## 2025-02-19 PROCEDURE — 99213 OFFICE O/P EST LOW 20 MIN: CPT | Performed by: PHYSICAL MEDICINE & REHABILITATION

## 2025-02-19 PROCEDURE — 3008F BODY MASS INDEX DOCD: CPT | Performed by: PHYSICAL MEDICINE & REHABILITATION

## 2025-02-19 ASSESSMENT — PAIN SCALES - GENERAL: PAINLEVEL_OUTOF10: 4

## 2025-02-24 NOTE — PROGRESS NOTES
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[employer] / Product Type: *No Product type* /    EVAL $178.00 / 30 PT/OT V PCY 0 USED NEEDS AUTH CONTIGO PAYS AT 90% AFTER DED 1750.00 29.64 APPLIED OOP 2550.00 29.64 APPLIED   Referring Provider: Sayra Asencio MD       Current Problem  Problem List Items Addressed This Visit    None            Precautions  Fall Risk: Low- reports RLE dragging  Denies: Cancer, Pacemaker, Diabetes, Hypertension, latex allergy, epilepsy/seizures, other known cardiac problems, other known neurologic problem, other known pulmonary problems, unexpected weight gain or loss, saddle anesthesia, night sweats, night pain, diplopia, and drop attacks  Past Surgical history: none  Past Medical history: functional movement disorder, anxiety/depression, chronic motor tics, OCD, and chronic migraines     General  Subjective      Erica Aragon denies any falls or complications since last session. Erica Aragon reports good effect from stretching exercises. No migraines since last visit. Feels like her head movement to her right side is limited but after a \"popping\" sensation feels better.    Erica Aragon reports fair compliance with HEP    Pain:  4/10  Pain location: R=L posterior cervical musculature > UT    Cleveland Cardoso, SPT, participated in this sesion under direct supervision of Rossy Bobby, PT, DPT, NCS    Objective     Palpation:   - posterior deep cervical neck extensor musculature tightness L>R   - C3-C4 spinous process tenderness   - C6-C7 spinous process tenderness   - suboccipital musculature tenderness    Treatments:  Abbreviation Key  NP = not performed this visit   NV = next visit    Assigned HEP- Medbridge Access Code: [email or web address]   Therapeutic Activity:   minutes   - seated " workplace simulation (asked pt. to sit in chair and simulate work posture at computer with cart in front of her)    - modifications made:     - towel roll to increase lumbar lordosis     - working in one area and trying to limit how much her head is turning on her body with multiple screens     - using adjustable height of desk/chair or a step for feet to rest on for support     - setting screen at just below eye level (also for mobile device)      - setting a timer for a couple hours time to get up and perform HEP     - Pt. given handout with ergonomic workplace modifications to follow    -reviewed options of OT/SLP and role of each. Erica to pursue via direct access if desired.    Neuromuscular Re-education:   minutes    - functional UE PNF patterns for UE in standing (without pain) for movement retraining using mirror for feedback     - L UE into D1 and D2 PNF pattern (no pain)  - R UE into D1 and D2 PNF pattern (no pain)  - alternating (and opposite) PNF patterns performed with small ball 10 x to each side x 2 sets (no pain)  - min verbal cuing to avoid breath-holding during activity  - diaphragmatic breathing 1 minute x 8 reps     Manual Therapy:    minutes   Performed with pt in supine  Manual intermittent cervical distraction - good relief   Suboccipital release - good relief  STM over beto cervical paraspinals  Cervical PA mobs grades I-II (focused on lower cervical spinous processes). Performed in supine.   Cervical up-gliding mobs grades I-II (focused on C3). Performed in supine - good relief  Applied B Kinesiotape to posterior cervical paraspinal musculature (instructed pt. on how to perform at home)      Outpatient Education: Reviewed and updated home exercise program. Provided verbal feedback to improve exercise technique.   The rationale for kinesiotape application was discussed with Erica Aragon.  she was advised that the tape will generally stay on for 2-3 days.  If skin irritation, such as  redness or itching were to occur, Erica Aragon was advised to remove the tape and gently wash the adhesive off.  she agreed to kinesiotaping.   Erica Aragon verbalizes understanding of all education provided: Yes    Assessment:    Erica Aragon completed treatment today which focused upon therapeutic activity simulating work environment set-up, movement retraining and manual interventions in order to address ongoing neck pain. Erica continues to present with mild forward head and rounded shoulders posture. Education, instruction and simulation provided on office posture and extended time spent at desk to increase functional endurance of muscles but, to allow for breaks every couple of hours. Erica noted decreased pain with incorporation of stretching of levator scap and UT with updated HEP and no symptoms of migraines demonstrating a good effect with intervention thus far. Erica continues to have tenderness in suboccipitals and mild-moderate tightness in UT and cervical paraspinals but, with improved soft tissue mobility in levator and UT without change in pain.  Erica had increased tightness/tone in B DNF L>R despite manual techniques.  The pt. showed a positive response to movement retraining exercise without pain demonstrating a promising capacity for improvement with this intervention. Erica's  response to tx was: Patient tolerated therapeutic interventions well and without any adverse events. Erica's Progress towards goals: Patient reports there has not been a significant change in functional abilities. Erica Aragon continues to have decreased strength, decreased ROM, and pain limiting participation in household chores and recreational activities. Further skilled therapy services are warranted to address the remaining impairments in order to progress towards functional goals. Erica left session with all questions answered, no increase in symptoms and decreased pain at 3/10 from 4/10 at the  start of the treatment session.      Plan:  Manual interventions for pain control + progress postural strength as able. Patient interested in returning to gym- instructed patient to come prepared with any questions next session . Consider incorporation sensory integration exercise (coordination/proprioceptive). Consider more movement retraining exercise

## 2025-02-25 ENCOUNTER — TREATMENT (OUTPATIENT)
Dept: PHYSICAL THERAPY | Facility: CLINIC | Age: 30
End: 2025-02-25
Payer: COMMERCIAL

## 2025-02-25 DIAGNOSIS — M79.18 MYOFASCIAL PAIN: ICD-10-CM

## 2025-02-25 DIAGNOSIS — M54.2 NECK PAIN: ICD-10-CM

## 2025-02-25 PROCEDURE — 97140 MANUAL THERAPY 1/> REGIONS: CPT | Mod: GP | Performed by: GENERAL ACUTE CARE HOSPITAL

## 2025-02-25 PROCEDURE — 97530 THERAPEUTIC ACTIVITIES: CPT | Mod: GP | Performed by: GENERAL ACUTE CARE HOSPITAL

## 2025-02-25 NOTE — PROGRESS NOTES
Physical Therapy    Physical Therapy Treatment    Patient Name: Erica Aragon  MRN: [id number]  Today's Date: 2/25/2025       Insurance - reviewed   Visit number: 6/6 (updated 02/26/25)   Payor:  EMPLOYEE MED[employer] PLAN / Plan:  EMPLOYEE MEDICAL PLAN CONSUMER [employer] / Product Type: *No Product type* /    EVAL $178.00 / 30 PT/OT V PCY 0 USED NEEDS AUTH CONTIGO PAYS AT 90% AFTER DED 1750.00 29.64 APPLIED OOP 2550.00 29.64 APPLIED   **PT AUTH 6V 1/20-3/20/25**   Referring Provider: Sayra Asencio MD   Reassess 2/25/2025       Current Problem  Problem List Items Addressed This Visit             ICD-10-CM    Neck pain M54.2    Myofascial pain M79.18       Precautions  Fall Risk: Low- reports RLE dragging  Denies: Cancer, Pacemaker, Diabetes, Hypertension, latex allergy, epilepsy/seizures, other known cardiac problems, other known neurologic problem, other known pulmonary problems, unexpected weight gain or loss, saddle anesthesia, night sweats, night pain, diplopia, and drop attacks  Past Surgical history: none  Past Medical history: functional movement disorder, anxiety/depression, chronic motor tics, OCD, and chronic migraines     General  Subjective      Erica Aragon denies any falls since last session. Erica Aragon reports good response to last session with 2 days of relief until pain returned and worsened this past Sunday. Erica Aragon noting migraine starting this past Sunday and is still in full effect today without known trigger. Erica Aragon notes recently going to gym with  and working on legs and core and is walking more. Pt. notes still going to see Chiropractor once a week for symptom relief.    Erica Aragon reports fair compliance with HEP, noting sometimes stretches are reliving and sometimes are aggravating.    Pain:  8/10 >> 7.5/10 after manual therapy 5-6/10 after dry needling  Pain location: R=L posterior cervical musculature > UT    Cleveland Cardoso, SPT, participated in this sesion  under direct supervision of Rossy Bobby, PT, DPT, NCS    Objective     Functional Assessments  Transfers: IND  Gait: IND  Bed mobility: IND     Dermatomes B UE:   grossly intact with eyes closed to light touch       RANGE OF MOTION: AROM in sitting via goniometer in degrees, * indicates pain  Cervical Flexion: 13*  Cervical Extension: 35*  Cervical Rotation R/L: 40*(tightness posterior same side)/53(tightness posterior same side)  Cervical Side Bend R/L: = 24*(on right side)/24*(on right side)     Strength/Myotomes: MMT in sitting, * indicates pain  Shoulder Elevation (C4) R/L: 5/5  Shoulder Abduction (C5) R/L: 5/4+  Elbow Flexion (C6) R/L: 5/4+  Wrist Ext (C6) R/L: 5/5  Elbow Extension (C7) R/L: 4+/4+  Wrist Flexion (C7) R/L: 5/5  Thumb Ext (C8) R/L: 5/4+  Ulnar Deviation (C8) R/L: 5/4+  Hand Intrinsics- abd (T1) R/L: 4/4     Special Tests:   Spurlings (nerve root compression): right positive (to forearm along radial distribution), left negative  Distraction: positive (symptomatic +) for head and neck pain relief  Zarate's: negative     Palpation: significant tightness in R and L cervical paraspinals, tightness in R scalenes     Outcome Measures:  Other Measures  Neck Disability Index: 21     Treatments:    Therapeutic Activity: 33 minutes  Assessment of Erica Aragon's POC goals completed today- see updated goals, objective, and assessment for further information. Educated Erica Aragno  regarding results of examination findings and discussed next steps in PT POC. Educated Erica Aragon regarding importance of continuing with good HEP compliance for optimal rehab results.     Manual Therapy:  14 minutes   Manual intermittent cervical distraction - good relief   Suboccipital release - good relief  STM over beto cervical paraspinals  Cervical PA mobs grades I-II (focused on lower cervical spinous processes). Performed in supine.   Cervical down-gliding mobs grades I-II (focused on C3). Performed in supine      Dry needling (SD NEEDLE INSERTION W/O INJECTION 1 OR 2 MUSCLES): 2 minutes  In prone    oDN to tender nodules in B C3-C7 using 0.23 x 30 mm    oDN to tender nodule in B UT using 0.23 x 30 mm    Outpatient Education:   The rationale, potential benefits, and risks for dry needling (DN) were discussed with Erica Aragon. Reviewed precautions to dry needling and written/verbal consent obtained by Erica Aragon (see chart for written consent).  No contraindications present.  her questions were answered and she agreed to dry needling treatment today.    Erica Aragon  advised  that following dry needling she may feel muscle soreness lasting 1-3 days, and that this is normal. Erica Aragon  advised to continue to stretch every hour for the next 1-2 days and educated in rationale for MHP and parameters to address post DN soreness. Erica Aragon may resume normal activity and exercise following DN as able. Please contact the office if any adverse symptoms occur outside of typical muscle soreness.     Erica Aragon verbalizes understanding of all education provided: Yes    Assessment:  Erica Aragon  has completed 6 physical therapy sessions to address neck and upper back pain.  Treatment has included: therapeutic exercise, therapeutic activity, neuromuscular re-education, manual therapy, dry needling, instruction in body mechanics, work simulation activities, and kinesiotaping. Erica reports good compliance with the prescribed physical therapy home exercise program, with fair compliance due to increased symptoms. Erica has made little progress towards therapy goals in comparison of today's measures to the initial evaluation but, Erica notes she has good days where symptoms can improve greatly with therapy intervention.  Subjectively she reports 2 days of pain relief which is significant for her.     At this time I recommend: Erica continue with physical therapy to address ongoing neck and upper back pain    Erica Aragon is in agreement with plan.      Justification for continued skilled care: Erica Aragon continues to have decreased ROM and pain limiting participation in ADLs, IADLs, and meaningful activities. Further skilled therapy services are warranted to address the remaining impairments in order to progress towards functional goals for the Erica to fully participate in an active lifestyle. Erica left session with all questions answered and no increase in symptoms.    Erica Aragon completed treatment today which focused upon manual interventions and dry needling in order to address ongoing neck pain. Erica continues to have tenderness in suboccipitals and mild-moderate tightness cervical paraspinals but, with improved soft tissue mobility in levator and UT. With dry needling intervention, Erica had increased tightness with trigger band in L>R posterior cervical paraspinals that improved post manual.  + TrP in B UT that lessened post DN. No adverse response to DN noted/reported and Erica Aragon reported pain decreased to a 5/10 post DN/post session.        Goals:  STG's (within 2 weeks)  Erica Aragon will decrease pain by >/= 2/10 points from baseline to improve ability to perform household/recreational activities. 2/25/25 - GOAL PROGRESSING - pt. noting pain fluctuates but, sees improvement.     Erica Aragon will demonstrate independence, excellent understanding of and report compliance with at least 3 exercises in her HEP to facilitate the learning process to maximize PT benefits and to facilitate independent rehab program upon discharge. 2/25/25 - GOAL MET     LTG's (by discharge)  Erica Aragon will decrease pain to 0-4/10 to improve ability to perform household/recreational/occupational activities. 2/25/25 - GOAL PROGRESSING - pt. noting pain fluctuates but, sees improvement. Symptoms currently exacerbated due to migraine.     Erica Aragon will score < 5 points on NDI to indicate no  disability during performance of everyday tasks. 2/25/25 - GOAL NOT MET.  Currently 21/50 and baseline was 19/50.     Erica Aragon will improve cervical rot by at least 5 deg to assist with head turns during driving. 2/25/25- GOAL NOT MET.  Currently limited due to increase in pain per subjective.     Erica Aragon will report at least 75% reduction in RUE paresthesias to reduce with dropping items at work and spilling beverages. - 2/25/25- GOAL NOT MET - pt. noting 0% change     Erica Aragon will voice confidence and no increased pain beyond baseline with physical activity for participation in volleyball and return to gym. - GOAL PARTIALLY MET 2/25/2025  - pt. notes going to gym and walking since starting physical therapy.     Erica Aragon will demonstrate independence,  excellent understanding of and report compliance with HEP to facilitate the learning process to maximize PT benefits and to facilitate independent rehab program upon discharge. -2/25/2025  - GOAL MET    Plan:   Erica continue with physical therapy 1x every 1-2 weeks for an additional 4-6 visits to address ongoing neck and upper back pain and ROM limitations.  Erica Aragon is in agreement with plan.    Consider mechanical traction trial and home unit if beneficial for self management.   Manual interventions for pain control + progress postural strength as able. Patient interested in returning to gym- instructed patient to come prepared with any questions next session . Consider incorporation sensory integration exercise (coordination/proprioceptive). Consider more movement retraining exercise    Time Calculation  Start Time: 1646  Stop Time: 1735  Time Calculation (min): 49 min     PT Therapeutic Procedures Time Entry  Manual Therapy Time Entry: 14  Therapeutic Activity Time Entry: 33  Needle Insertion w/o Injection 1 or 2: 2

## 2025-03-04 ENCOUNTER — APPOINTMENT (OUTPATIENT)
Dept: PHYSICAL THERAPY | Facility: CLINIC | Age: 30
End: 2025-03-04
Payer: COMMERCIAL

## 2025-03-25 ENCOUNTER — OFFICE VISIT (OUTPATIENT)
Dept: URGENT CARE | Age: 30
End: 2025-03-25
Payer: COMMERCIAL

## 2025-03-25 VITALS
TEMPERATURE: 98.1 F | RESPIRATION RATE: 16 BRPM | WEIGHT: 165 LBS | DIASTOLIC BLOOD PRESSURE: 91 MMHG | OXYGEN SATURATION: 99 % | SYSTOLIC BLOOD PRESSURE: 136 MMHG | BODY MASS INDEX: 27.49 KG/M2 | HEIGHT: 65 IN | HEART RATE: 77 BPM

## 2025-03-25 DIAGNOSIS — J02.9 SORE THROAT: ICD-10-CM

## 2025-03-25 DIAGNOSIS — B27.90 INFECTIOUS MONONUCLEOSIS WITHOUT COMPLICATION, INFECTIOUS MONONUCLEOSIS DUE TO UNSPECIFIED ORGANISM: Primary | ICD-10-CM

## 2025-03-25 LAB
POC HUMAN RHINOVIRUS PCR: NEGATIVE
POC INFLUENZA A VIRUS PCR: NEGATIVE
POC INFLUENZA B VIRUS PCR: NEGATIVE
POC RAPID MONO: POSITIVE
POC RESPIRATORY SYNCYTIAL VIRUS PCR: NEGATIVE
POC STREPTOCOCCUS PYOGENES (GROUP A STREP) PCR: NEGATIVE

## 2025-03-25 PROCEDURE — 87651 STREP A DNA AMP PROBE: CPT | Performed by: PHYSICIAN ASSISTANT

## 2025-03-25 PROCEDURE — 87631 RESP VIRUS 3-5 TARGETS: CPT | Performed by: PHYSICIAN ASSISTANT

## 2025-03-25 PROCEDURE — 99213 OFFICE O/P EST LOW 20 MIN: CPT | Performed by: PHYSICIAN ASSISTANT

## 2025-03-25 PROCEDURE — 3008F BODY MASS INDEX DOCD: CPT | Performed by: PHYSICIAN ASSISTANT

## 2025-03-25 PROCEDURE — 86308 HETEROPHILE ANTIBODY SCREEN: CPT | Performed by: PHYSICIAN ASSISTANT

## 2025-03-25 RX ORDER — METHYLPREDNISOLONE 4 MG/1
TABLET ORAL
Qty: 21 TABLET | Refills: 0 | Status: SHIPPED | OUTPATIENT
Start: 2025-03-25 | End: 2025-04-01

## 2025-03-25 ASSESSMENT — PATIENT HEALTH QUESTIONNAIRE - PHQ9
SUM OF ALL RESPONSES TO PHQ9 QUESTIONS 1 AND 2: 0
1. LITTLE INTEREST OR PLEASURE IN DOING THINGS: NOT AT ALL
2. FEELING DOWN, DEPRESSED OR HOPELESS: NOT AT ALL

## 2025-03-25 ASSESSMENT — ENCOUNTER SYMPTOMS: SORE THROAT: 1

## 2025-03-25 NOTE — PATIENT INSTRUCTIONS
Pt. not placed on skilled therapy services secondary to pt. performing at their baseline functional mobility performance. anticipate discharge to home with no skilled PT services. Warm liquids with honey  Increase fluid intake; encourage electrolytes such as Pedialyte  10 deep breaths an hour  May use tylenol as needed for fevers, chills, body aches  Avoid contact sports over the next 2-4 weeks  Avoid sharing foods, drinks, kissing

## 2025-03-25 NOTE — PROGRESS NOTES
"Subjective   Patient ID: Erica Aragon is a 29 y.o. female. They present today with a chief complaint of Sore Throat (X1 day sore throat swollen tonsils and big white spot on the right side of patients tonsils ).    History of Present Illness    Sore Throat       29-year-old patient presents to clinic with complaints of sore throat with associated white spots on the tonsils, fevers, chills for 1 day.  Reports the pain is dull and rated at 4 out of 10.  Reports pain is worse with swallowing. Reports has history of valentina bar virus which pt. has seen immunologist for. Denies nausea, vomiting, ear pain, dizziness, rhinorrhea, sinus pain, cough, shortness of breath.      Past Medical History  Allergies as of 03/25/2025 - Reviewed 03/25/2025   Allergen Reaction Noted    Penicillins Unknown 01/14/2014       (Not in a hospital admission)       Past Medical History:   Diagnosis Date    Infectious mononucleosis, unspecified without complication     Chronic EBV infection    Personal history of other diseases of the respiratory system     History of asthma    Personal history of other mental and behavioral disorders     History of depression    Personal history of other mental and behavioral disorders     History of anxiety       History reviewed. No pertinent surgical history.     reports that she has never smoked. She has never used smokeless tobacco. She reports current alcohol use. She reports that she does not use drugs.    Review of Systems  ROS negative with the exception as noted on HPI                                Objective    Vitals:    03/25/25 0918   BP: (!) 136/91   BP Location: Left arm   Patient Position: Sitting   BP Cuff Size: Adult   Pulse: 77   Resp: 16   Temp: 36.7 °C (98.1 °F)   TempSrc: Oral   SpO2: 99%   Weight: 74.8 kg (165 lb)   Height: 1.651 m (5' 5\")     No LMP recorded.    Physical Exam  Constitutional:       Appearance: Normal appearance.   HENT:      Head: Normocephalic and atraumatic.      " Right Ear: Tympanic membrane, ear canal and external ear normal.      Left Ear: Tympanic membrane, ear canal and external ear normal.      Nose: Mucosal edema (and erythema) present. No rhinorrhea.      Right Sinus: No maxillary sinus tenderness or frontal sinus tenderness.      Left Sinus: No maxillary sinus tenderness or frontal sinus tenderness.      Mouth/Throat:      Lips: Pink.      Mouth: Mucous membranes are moist. No oral lesions.      Dentition: Normal dentition. No gingival swelling.      Tongue: No lesions. Tongue does not deviate from midline.      Palate: No mass and lesions.      Pharynx: No pharyngeal swelling, posterior oropharyngeal erythema, uvula swelling or postnasal drip.      Tonsils: Tonsillar exudate present. 1+ on the right. 1+ on the left.   Cardiovascular:      Rate and Rhythm: Normal rate and regular rhythm.      Heart sounds: No murmur heard.  Pulmonary:      Effort: Pulmonary effort is normal. No respiratory distress.      Breath sounds: Normal breath sounds. No stridor. No wheezing, rhonchi or rales.   Lymphadenopathy:      Cervical: Cervical adenopathy present.   Neurological:      Mental Status: She is alert.         Procedures    Point of Care Test & Imaging Results from this visit  Results for orders placed or performed in visit on 03/25/25   POCT SPOTFIRE R/ST Panel Mini w/Strep A ([facility]) manually resulted   Result Value Ref Range    POC Group A Strep, PCR Negative Negative    POC Respiratory Syncytial Virus PCR Negative Negative    POC Influenza A Virus PCR Negative Negative    POC Influenza B Virus PCR Negative Negative    POC Human Rhinovirus PCR Negative Negative   POCT Infectious mononucleosis antibody manually resulted   Result Value Ref Range    POC Rapid Mono Positive (A) Negative      No results found.    Diagnostic study results (if any) were reviewed by Margarita Blackburn PA-C.    Assessment/Plan   Allergies, medications, history, and pertinent labs/EKGs/Imaging  reviewed by Margarita Blackburn PA-C.   sore throat with associated white spots on the tonsils, fevers, chills for 1 day.  Discussed may use warm liquids with honey, Tylenol as needed for pain if it occurs.  Advised to increase fluid intake;  encourage Pedialyte.  Advised to take 10 deep breaths an hour.  Advised to avoid contact sports over the next 2 to 4 weeks to prevent spleen rupture.  Avoid sharing foods, drinks, kissing to avoid spread of infection.   Monitor for worsening symptoms such as severe swelling of the tonsils or patient is having trouble swallowing saliva or having shortness of breath if this occurs proceed to the ED. Risk, benefits, and potential side effects of medication(s) discussed with pt. Discussed disease/illness presentation, treatment options, progression, complications, and outcomes with patient. Pt. Has expressed understanding and is an agreement of plan of care.     Medical Decision Making      Orders and Diagnoses  Diagnoses and all orders for this visit:  Infectious mononucleosis without complication, infectious mononucleosis due to unspecified organism  -     methylPREDNISolone (Medrol Dospak) 4 mg tablets; Follow schedule on package instructions  Sore throat  -     POCT SPOTFIRE R/ST Panel Mini w/Strep A ([facility]) manually resulted  -     POCT Infectious mononucleosis antibody manually resulted      Medical Admin Record      Patient disposition: Home    Electronically signed by Margarita Blackburn PA-C  2:01 PM

## 2025-05-19 ENCOUNTER — APPOINTMENT (OUTPATIENT)
Dept: PHYSICAL MEDICINE AND REHAB | Facility: CLINIC | Age: 30
End: 2025-05-19
Payer: COMMERCIAL

## 2025-05-19 NOTE — PROGRESS NOTES
[Referred by friend, would like second opinion]    Chief complaint:  neck and upper back pain follow up    This is a pleasant 29 y.o. right handed female, with past medical history significant for functional movement disorder, anxiety/depression, chronic motor tics, and chronic migraines who presents for follow up of neck and upper back pain.    She was last seen here on 2/19/2025, at which point I advised her to continue gabapentin as it worked best for her.    I advised her to continue physical therapy/working with a  and doing her home exercises.    She wanted to continue with conservative management     She rates her pain as 4/10, previously 8/10    Otherwise, there have been no changes to medications or past medical history since the last visit.      ______________________________________  2/19/25: Neck x-ray reviewed, continue working with  and upper body and core strengthening, consider other medications and injections in the future  5/19/2025: Late cancel  7/21/25:  ______________________________________  As a reminder:    TIMELINE OF COMPLAINT(S):    Functional movement disorder diagnosed November 2021, symptoms began October 2021.  Overmedication, additional psych comorbids thought to be possible cause.  Was being seen by Dr. Shoemaker from [facility] neurology for FMD, last seen 6/5/24. Since starting a new job at  in November 2023, she was no longer able to see [facility] neurologist being to out of network. Last PT session ended October 2023 and she has not been able to establish with  provider to help with FMD.    Since starting at , she has seen neurologist for her FMD who referred her to psychiatry and psychology. The patient was not happy with this and did not think it was a comprehensive way to treat her FMD. She would like to resume all of her care that was provided at [facility] which included PT, medications, counseling and more.    She has past psychiatric history including an  "episode of being \"infested with millipedes\" at [facility] working at [employer]. She started seeing a psychologist and then went into inpatient psychiatric hospitalization in 2021. Few days after being home from this hospitalization she began having motor tics, specifically in neck.  Gradually she also began to have tremors in right arm and right leg. She attributes the neck pain from the tics, which first started after her stressful experience at psychiatric facility. She does not have any pain elsewhere, just upper back and neck area, and she never had this before this particular hospitalization. These issues have continued to interrupt her life and interfere with IADLs and ADLs.     Note from acupuncturist on 10/10/2024 personally reviewed today.  Chronic neck pain, migraines and headaches.  Acupuncture performed, recommending 6-8 treatments, once per week.    Note from Dr. Baltazar, neurologist, on 7/24/2024 personally reviewed today.  Recommended headache expert for migraine management, Botox injections, integrative health referral for pain.    Note from Dr. Luis on 12/4/2023 personally reviewed today.  Discussed stress management techniques, therapy progress, and lifestyle changes to manage anxiety, tics and functional movement disorder.  Instructed to walk daily with socialization, no medication changes.    Pain:  LOCATION- Neck, head, back and bilateral shoulders  RADIATION- None  CONSTANT or INTERMITTENT- Constant  SEVERITY/QUANTITY- 8  QUALITY- aching and sharp and shooitng  WEAKNESS- Yes, arms and legs  NUMBNESS/TINGLING- Yes, arms and hands  ASSOCIATED WITH- Weakness and some hx of falls  EXACERBATED BY- Tics sitting, standing  BETTER WITH- Laying down  TRIED-      Anti-Inflammatories:      Muscle relaxants: Previously cyclobenzaprine, chlorzoxazone did not help      Anti-depressants:      Neuroleptics: Gabapentin 300 mg at night helps with RLS symptoms      LDN:    PHYSICAL THERAPY: Yes, Oct 2023 " and has noticed significant improvement with sxs. States this provided biggest relief for her   CHIROPRACTIC MANIPULATION: Yes, has noticed significant relief  TENS unit: Yes  ACUPUNCTURE TREATMENTS: Yes   DEEP TISSUE MASSAGE THERAPY: Yes  OSTEOPATHIC MANIPULATION THERAPY: No    EMG/NCS: No    INJECTIONS: In the past she has received Botox with [facility] for migraine with Miladis Rahman CNP    IMAGING:    === 02/17/25 ===  XR CERVICAL SPINE 2-3 VIEWS  - Impression -  No evidence of an acute fracture or subluxation.    FUNCTIONAL HISTORY: The patient is independent in all ADLs, mobility, and driving. The patient does not use any assistive device.    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 The patient denies any bowel or bladder incontinence/accidents, night sweats, fevers, chills, recent significant weight loss. A 14 point review of systems was reviewed with the patient and is as above and otherwise negative.  ROS questionnaire positive for headache, muscle pain/tightness/spasms, back pain, depression, anxiety, sleep disturbances          PHYSICAL EXAM    GEN - Alert, well-developed, well-nourished, no acute distress  PSYCH - Cooperative, appropriate mood and affect  HEENT - NC/AT  RESP - Non-labored respirations, equal expansion  CV - warm and well-perfused, No cyanosis or edema in extremities  ABD- soft, ND  SKIN - No rash.    Previous:    NECK/EXTR- Cervical ROM is full with forward flexion, extension, rotation, and side bending with no pain. Spurlings and Lhermitte's negative. No tenderness of the cervical spinous processes. Mild tenderness of the cervical paraspinal muscles and trapezei musculature as well as the scapular musculature. Scapulae are symmetrical  without evidence of medial or lateral winging.      NEURO -   UE strength 5/5 -  including shoulder abduction, biceps, triceps, wrist extensors, finger flexors, interossei, and    LE strength 5/5 -  including hip flexors, knee flexors, knee extensors, ankle dorsiflexors, ankle plantar flexors, and EHL   Sensation - intact to light touch in bilateral lower and upper extremities.   Reflexes - 2+ biceps, brachioradialis, triceps, patellar and Achilles reflexes bilaterally, Zarate's negative bilaterally  GAIT - Normal base, normal stride length, non-antalgic. Able to toe walk and heel walk without difficulty. Able to perform tandem gait without difficulty.    IMPRESSION:    This is a pleasant 29 y.o. right handed female, with past medical history significant for functional movement disorder, anxiety/depression, chronic motor tics, and chronic migraines who presents for follow up of neck and upper back pain.  Physical exam is reassuring for lack of neurologic compromise.  Despite all the above psychiatric and psychological conditions, the patient has an isolated issue of chronic neck and upper back pain that she attributes to stem from her tic disorder that she developed after a particular stressful psychiatric hospitalization.  At this point, she feels that this is the most limiting to her day-to-day functioning and we discussed various options to help with this.  Etiology is likely myofascial pain/tension, versus underlying (likely mild) spondylosis.      -Continue gabapentin as it works best for you for now but consider other medications in the future  -Continue working with a  focusing on core and upper body/shoulder girdle strengthening, avoid any movement or activity that reproduce or cause neck or head pain.  -Previously referral to psychology and psychiatry provided, Try CBT, DBT, EMDR  -Consider trigger point injections, handout provided previously  -Follow-up 3 months but message me sooner  if needed;, we can consider trigger point injections at this visit if needed      The patient expressed understanding and agreement with the assessment and plan. Patient encouraged to contact us should they have any questions, concerns, or any changes in symptoms.      Thank you for allowing me to participate in the care of your patient.            ** Dictated with voice recognition software, please forgive any errors in grammar and/or spelling **

## 2025-07-21 ENCOUNTER — APPOINTMENT (OUTPATIENT)
Dept: PHYSICAL MEDICINE AND REHAB | Facility: CLINIC | Age: 30
End: 2025-07-21
Payer: COMMERCIAL

## 2025-08-19 ENCOUNTER — APPOINTMENT (OUTPATIENT)
Dept: PRIMARY CARE | Facility: CLINIC | Age: 30
End: 2025-08-19
Payer: COMMERCIAL

## 2025-08-19 VITALS
BODY MASS INDEX: 28.65 KG/M2 | DIASTOLIC BLOOD PRESSURE: 75 MMHG | OXYGEN SATURATION: 97 % | SYSTOLIC BLOOD PRESSURE: 116 MMHG | TEMPERATURE: 98.3 F | WEIGHT: 167.8 LBS | HEIGHT: 64 IN | HEART RATE: 71 BPM

## 2025-08-19 DIAGNOSIS — G43.809 OTHER MIGRAINE WITHOUT STATUS MIGRAINOSUS, NOT INTRACTABLE: ICD-10-CM

## 2025-08-19 DIAGNOSIS — Z00.00 ROUTINE GENERAL MEDICAL EXAMINATION AT A HEALTH CARE FACILITY: Primary | ICD-10-CM

## 2025-08-19 DIAGNOSIS — J45.20 MILD INTERMITTENT ASTHMA WITHOUT COMPLICATION (HHS-HCC): ICD-10-CM

## 2025-08-19 DIAGNOSIS — G90.1 DYSAUTONOMIA (MULTI): ICD-10-CM

## 2025-08-19 DIAGNOSIS — K64.8 INTERNAL HEMORRHOID: ICD-10-CM

## 2025-08-19 PROBLEM — G43.909 MIGRAINE WITHOUT STATUS MIGRAINOSUS, NOT INTRACTABLE: Status: ACTIVE | Noted: 2025-08-19

## 2025-08-19 PROCEDURE — 99395 PREV VISIT EST AGE 18-39: CPT | Performed by: FAMILY MEDICINE

## 2025-08-19 PROCEDURE — 1036F TOBACCO NON-USER: CPT | Performed by: FAMILY MEDICINE

## 2025-08-19 PROCEDURE — 3008F BODY MASS INDEX DOCD: CPT | Performed by: FAMILY MEDICINE

## 2025-08-19 RX ORDER — HYDROCORTISONE 25 MG/G
CREAM TOPICAL 4 TIMES DAILY PRN
Qty: 30 G | Refills: 5 | Status: SHIPPED | OUTPATIENT
Start: 2025-08-19 | End: 2026-08-19

## 2025-08-19 ASSESSMENT — PAIN SCALES - GENERAL: PAINLEVEL_OUTOF10: 7

## 2025-08-19 ASSESSMENT — PATIENT HEALTH QUESTIONNAIRE - PHQ9
2. FEELING DOWN, DEPRESSED OR HOPELESS: NOT AT ALL
SUM OF ALL RESPONSES TO PHQ9 QUESTIONS 1 AND 2: 0
1. LITTLE INTEREST OR PLEASURE IN DOING THINGS: NOT AT ALL

## 2025-08-19 ASSESSMENT — ENCOUNTER SYMPTOMS: DEPRESSION: 0

## 2025-09-15 ENCOUNTER — APPOINTMENT (OUTPATIENT)
Dept: PHYSICAL MEDICINE AND REHAB | Facility: CLINIC | Age: 30
End: 2025-09-15
Payer: COMMERCIAL